# Patient Record
Sex: MALE | Race: BLACK OR AFRICAN AMERICAN | NOT HISPANIC OR LATINO | Employment: OTHER | URBAN - METROPOLITAN AREA
[De-identification: names, ages, dates, MRNs, and addresses within clinical notes are randomized per-mention and may not be internally consistent; named-entity substitution may affect disease eponyms.]

---
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
## 2020-08-23 ENCOUNTER — HOSPITAL ENCOUNTER (EMERGENCY)
Facility: HOSPITAL | Age: 20
Discharge: HOME | End: 2020-08-23
Payer: COMMERCIAL

## 2020-08-23 ENCOUNTER — APPOINTMENT (EMERGENCY)
Dept: RADIOLOGY | Facility: HOSPITAL | Age: 20
End: 2020-08-23
Payer: COMMERCIAL

## 2020-08-23 VITALS
RESPIRATION RATE: 18 BRPM | BODY MASS INDEX: 23.73 KG/M2 | SYSTOLIC BLOOD PRESSURE: 148 MMHG | OXYGEN SATURATION: 96 % | HEART RATE: 75 BPM | DIASTOLIC BLOOD PRESSURE: 95 MMHG | TEMPERATURE: 98.2 F | WEIGHT: 175 LBS

## 2020-08-23 DIAGNOSIS — J06.9 VIRAL UPPER RESPIRATORY TRACT INFECTION: Primary | ICD-10-CM

## 2020-08-23 DIAGNOSIS — J31.0 RHINITIS, UNSPECIFIED TYPE: ICD-10-CM

## 2020-08-23 PROCEDURE — 71046 X-RAY EXAM CHEST 2 VIEWS: CPT

## 2020-08-23 PROCEDURE — U0003 INFECTIOUS AGENT DETECTION BY NUCLEIC ACID (DNA OR RNA); SEVERE ACUTE RESPIRATORY SYNDROME CORONAVIRUS 2 (SARS-COV-2) (CORONAVIRUS DISEASE [COVID-19]), AMPLIFIED PROBE TECHNIQUE, MAKING USE OF HIGH THROUGHPUT TECHNOLOGIES AS DESCRIBED BY CMS-2020-01-R: HCPCS | Performed by: PHYSICIAN ASSISTANT

## 2020-08-23 PROCEDURE — 99284 EMERGENCY DEPT VISIT MOD MDM: CPT | Performed by: PHYSICIAN ASSISTANT

## 2020-08-23 PROCEDURE — 99283 EMERGENCY DEPT VISIT LOW MDM: CPT

## 2020-08-23 NOTE — ED PROVIDER NOTES
History  Chief Complaint   Patient presents with   Rosezella Doles Like Symptoms     Pt reports sore throat, cough, and runny nose since yesterday, pt requesting COVID testing      Patient with  past medical history of asthma presents to emergency department complaining of 2 day h/o sore throat, dry cough, and runny nose/clear rhinorrhea, no fever, no chest pain, no shortness of breath, no rash, no abdominal pain, no bowel or urinary symptoms  Patient states he was gambling, playing the [employer], at [facility], at [employer],  however but he was/and most others were wearing masks  Patient has no known COVID contacts, however states he lives with his mother/family and elderly grandmother and his mother is forcing him to get testing; so pt is here requesting testing  None       Past Medical History:   Diagnosis Date    Asthma        History reviewed  No pertinent surgical history  Family History   Problem Relation Age of Onset    No Known Problems Mother     No Known Problems Father      I have reviewed and agree with the history as documented  E-Cigarette/Vaping     E-Cigarette/Vaping Substances     Social History     Tobacco Use    Smoking status: Never Smoker    Smokeless tobacco: Never Used   Substance Use Topics    Alcohol use: Not Currently     Comment: none    Drug use: Not Currently     Comment: none       Review of Systems   Constitutional: Negative for chills and fever  HENT: Positive for rhinorrhea, sinus pain and sore throat  Negative for dental problem, ear pain, hearing loss and trouble swallowing  Eyes: Negative for visual disturbance  Respiratory: Positive for cough  Negative for chest tightness and shortness of breath  Cardiovascular: Negative for chest pain and leg swelling  Gastrointestinal: Negative for abdominal pain, blood in stool, diarrhea, nausea and vomiting  Genitourinary: Negative for dysuria, frequency and penile pain     Musculoskeletal: Negative for arthralgias and myalgias  Skin: Negative for color change, pallor and rash  Neurological: Negative for dizziness and weakness  Psychiatric/Behavioral: Negative for behavioral problems and self-injury  Physical Exam  Physical Exam  Vitals signs and nursing note reviewed  Constitutional:       General: He is not in acute distress  Appearance: Normal appearance  He is well-developed and normal weight  He is not ill-appearing  HENT:      Head: Normocephalic and atraumatic  Right Ear: Tympanic membrane and external ear normal       Left Ear: Tympanic membrane and external ear normal       Nose: Congestion and rhinorrhea (clear) present  Mouth/Throat:      Mouth: Mucous membranes are moist       Pharynx: Oropharynx is clear  Eyes:      Conjunctiva/sclera: Conjunctivae normal    Neck:      Musculoskeletal: Normal range of motion  Cardiovascular:      Rate and Rhythm: Normal rate and regular rhythm  Pulmonary:      Effort: Pulmonary effort is normal  No respiratory distress  Breath sounds: Normal breath sounds  Abdominal:      General: Bowel sounds are normal       Palpations: Abdomen is soft  Tenderness: There is no abdominal tenderness  Musculoskeletal: Normal range of motion  General: No tenderness  Lymphadenopathy:      Cervical: No cervical adenopathy  Skin:     General: Skin is warm and dry  Findings: No rash  Neurological:      Mental Status: He is alert and oriented to person, place, and time  Motor: No weakness     Psychiatric:         Mood and Affect: Mood normal          Behavior: Behavior normal          Vital Signs  ED Triage Vitals [08/23/20 1434]   Temperature Pulse Respirations Blood Pressure SpO2   98 2 °F (36 8 °C) 75 18 148/95 96 %      Temp Source Heart Rate Source Patient Position - Orthostatic VS BP Location FiO2 (%)   Tympanic Monitor Sitting Left arm --      Pain Score       No Pain           Vitals:    08/23/20 1434   BP: 148/95   Pulse: 75   Patient Position - Orthostatic VS: Sitting         Visual Acuity      ED Medications  Medications - No data to display    Diagnostic Studies  Results Reviewed     Procedure Component Value Units Date/Time    Novel Coronavirus (COVID-19), PCR [facility] [[id number]] Updated:  08/23/20 1454    Lab Status: In process Specimen:  Nares from Nose                  XR chest 2 views   ED Interpretation by Vee Sanchez MD (08/23 1626)   CXR  No acute disease                 Procedures  Procedures         ED Course       US AUDIT      Most Recent Value   Initial Alcohol Screen: US AUDIT-C    1  How often do you have a drink containing alcohol?  0 Filed at: 08/23/2020 1434   2  How many drinks containing alcohol do you have on a typical day you are drinking? 0 Filed at: 08/23/2020 1434   3a  Male UNDER 65: How often do you have five or more drinks on one occasion? 0 Filed at: 08/23/2020 1434   Audit-C Score  0 Filed at: 08/23/2020 1434                  CLAUDINE/DAST-10      Most Recent Value   How many times in the past year have you    Used an illegal drug or used a prescription medication for non-medical reasons? Never Filed at: 08/23/2020 1435                                MDM      Disposition  Final diagnoses:   Viral upper respiratory tract infection   Rhinitis, unspecified type     Time reflects when diagnosis was documented in both MDM as applicable and the Disposition within this note     Time User Action Codes Description Comment    8/23/2020  4:24 PM Quin Hernandse Add [J06 9] Viral upper respiratory tract infection     8/23/2020  4:24 PM Quin Hernandes Add [J31 0] Rhinitis, unspecified type       ED Disposition     ED Disposition Condition Date/Time Comment    Discharge Stable Sun Aug 23, 2020  4:24 PM Juan Francisco Spence discharge to home/self care              Follow-up Information     Follow up With Specialties Details Why [address], MD Pediatric[facility]   [address]            There are no discharge medications for this patient  No discharge procedures on file      PDMP Review     None          ED Provider  Electronically Signed by           Jay Mills PA-C  08/23/20 8794

## 2020-08-23 NOTE — DISCHARGE INSTRUCTIONS
Use Tylenol every 4 hours or Motrin every 6 hours; you can alternate the 2 medications taking something every 3 hours for pain or fever  You can use over-the-counter antihistamines like Claritin-D, Zyrtec-D with Flonase for nasal congestion symptoms  Robitussin DM to help with cough  If no improvement follow-up with your doctor in next few days

## 2020-08-25 LAB — SARS-COV-2 RNA SPEC QL NAA+PROBE: NOT DETECTED

## 2021-05-24 ENCOUNTER — OFFICE VISIT (OUTPATIENT)
Dept: FAMILY MEDICINE CLINIC | Facility: CLINIC | Age: 21
End: 2021-05-24
Payer: COMMERCIAL

## 2021-05-24 VITALS
WEIGHT: 185 LBS | DIASTOLIC BLOOD PRESSURE: 82 MMHG | BODY MASS INDEX: 25.06 KG/M2 | SYSTOLIC BLOOD PRESSURE: 118 MMHG | HEIGHT: 72 IN | TEMPERATURE: 97.9 F | RESPIRATION RATE: 16 BRPM | OXYGEN SATURATION: 97 % | HEART RATE: 68 BPM

## 2021-05-24 DIAGNOSIS — R22.2 MASS OF SUBCUTANEOUS TISSUE OF BACK: ICD-10-CM

## 2021-05-24 DIAGNOSIS — M54.50 ACUTE BILATERAL LOW BACK PAIN WITHOUT SCIATICA: Primary | ICD-10-CM

## 2021-05-24 PROCEDURE — 99203 OFFICE O/P NEW LOW 30 MIN: CPT | Performed by: FAMILY MEDICINE

## 2021-05-24 RX ORDER — CYCLOBENZAPRINE HCL 5 MG
5 TABLET ORAL 3 TIMES DAILY PRN
Qty: 20 TABLET | Refills: 0 | Status: SHIPPED | OUTPATIENT
Start: 2021-05-24 | End: 2021-08-10

## 2021-05-24 RX ORDER — NAPROXEN 500 MG/1
500 TABLET ORAL 2 TIMES DAILY WITH MEALS
Qty: 20 TABLET | Refills: 0 | Status: SHIPPED | OUTPATIENT
Start: 2021-05-24 | End: 2021-08-10

## 2021-05-24 NOTE — ASSESSMENT & PLAN NOTE
Pt has large tender mass in mid thoracic region of back  Will refer to Gen Surgery for eval and mgmt

## 2021-05-24 NOTE — PROGRESS NOTES
BMI Counseling: Body mass index is 25 09 kg/m²  The BMI is above normal  Nutrition recommendations include decreasing portion sizes, encouraging healthy choices of fruits and vegetables, consuming healthier snacks and moderation in carbohydrate intake  Exercise recommendations include exercising 3-5 times per week  No pharmacotherapy was ordered  Assessment/Plan:         Problem List Items Addressed This Visit        Other    Mass of subcutaneous tissue of back     Pt has large tender mass in mid thoracic region of back  Will refer to Gen Surgery for eval and mgmt  Relevant Orders    Ambulatory referral to General Surgery    Acute bilateral low back pain without sciatica - Primary     Pt has had it for past week  Likely muscular  Will try Naproxen 500 mg bid prn and muscle relaxer  Pt to use heating pad prn and to do stretching prn  Relevant Medications    cyclobenzaprine (FLEXERIL) 5 mg tablet    naproxen (NAPROSYN) 500 mg tablet            Subjective:      Patient ID: Nilsa Ferrell is a 24 y o  male  Pt here for 1 week hx of back pain  No known injury but is   Pain is 6 out of 10 and not getting better  Hurts to bend  Feels it in lower back area  No radiation down legs and no numbness in legs  Not taking any meds for pain         The following portions of the patient's history were reviewed and updated as appropriate:   Past Medical History:  He has a past medical history of Asthma ,  _______________________________________________________________________  Medical Problems:  does not have any pertinent problems on file ,  _______________________________________________________________________  Past Surgical History:   has no past surgical history on file ,  _______________________________________________________________________  Family History:  family history includes No Known Problems in his father and mother ,  _______________________________________________________________________  Social History:   reports that he has never smoked  He has never used smokeless tobacco  He reports previous alcohol use  He reports previous drug use ,  _______________________________________________________________________  Allergies:  has No Known Allergies     _______________________________________________________________________  Current Outpatient Medications   Medication Sig Dispense Refill    cyclobenzaprine (FLEXERIL) 5 mg tablet Take 1 tablet (5 mg total) by mouth 3 (three) times a day as needed for muscle spasms 20 tablet 0    naproxen (NAPROSYN) 500 mg tablet Take 1 tablet (500 mg total) by mouth 2 (two) times a day with meals 20 tablet 0     No current facility-administered medications for this visit       _______________________________________________________________________  Review of Systems   Constitutional: Negative for fatigue and unexpected weight change  Respiratory: Negative for cough and shortness of breath  Cardiovascular: Negative for chest pain  Gastrointestinal: Negative for abdominal pain, constipation, diarrhea and vomiting  Musculoskeletal: Positive for back pain  Negative for arthralgias  Neurological: Negative for dizziness and headaches  Psychiatric/Behavioral: Negative for dysphoric mood  The patient is not nervous/anxious  Objective:  Vitals:    05/24/21 1612   BP: 118/82   BP Location: Left arm   Patient Position: Sitting   Cuff Size: Standard   Pulse: 68   Resp: 16   Temp: 97 9 °F (36 6 °C)   TempSrc: Temporal   SpO2: 97%   Weight: 83 9 kg (185 lb)   Height: 6' (1 829 m)     Body mass index is 25 09 kg/m²  Physical Exam  Vitals signs and nursing note reviewed  Constitutional:       Appearance: Normal appearance  He is well-developed and normal weight  Neck:      Musculoskeletal: Normal range of motion and neck supple  Thyroid: No thyromegaly     Cardiovascular: Rate and Rhythm: Normal rate and regular rhythm  Heart sounds: Normal heart sounds  No murmur  Pulmonary:      Effort: Pulmonary effort is normal  No respiratory distress  Breath sounds: Normal breath sounds  No wheezing  Musculoskeletal:      Right lower leg: No edema  Left lower leg: No edema  Comments: TTP b/l lumbar area, pt also has 7-e cm spongy mass mid thoracic region and tender to touch   Lymphadenopathy:      Cervical: No cervical adenopathy  Neurological:      Mental Status: He is alert and oriented to person, place, and time  Cranial Nerves: No cranial nerve deficit  Psychiatric:         Mood and Affect: Mood normal          Behavior: Behavior normal          Thought Content:  Thought content normal          Judgment: Judgment normal

## 2021-05-24 NOTE — ASSESSMENT & PLAN NOTE
Pt has had it for past week  Likely muscular  Will try Naproxen 500 mg bid prn and muscle relaxer  Pt to use heating pad prn and to do stretching prn

## 2021-05-27 ENCOUNTER — CONSULT (OUTPATIENT)
Dept: SURGERY | Facility: CLINIC | Age: 21
End: 2021-05-27
Payer: COMMERCIAL

## 2021-05-27 VITALS
DIASTOLIC BLOOD PRESSURE: 72 MMHG | TEMPERATURE: 98.5 F | WEIGHT: 185 LBS | BODY MASS INDEX: 25.06 KG/M2 | HEIGHT: 72 IN | RESPIRATION RATE: 18 BRPM | SYSTOLIC BLOOD PRESSURE: 126 MMHG | HEART RATE: 76 BPM

## 2021-05-27 DIAGNOSIS — R22.2 MASS OF SUBCUTANEOUS TISSUE OF BACK: ICD-10-CM

## 2021-05-27 PROCEDURE — 99204 OFFICE O/P NEW MOD 45 MIN: CPT | Performed by: SURGERY

## 2021-05-27 NOTE — PROGRESS NOTES
Assessment/Plan: subcutaneous lipoma which is probably intramuscular of the back  This will be excised next week as an outpatient    No problem-specific Assessment & Plan notes found for this encounter  Diagnoses and all orders for this visit:    Mass of subcutaneous tissue of back  -     Ambulatory referral to General Surgery          Subjective:      Patient ID: Laurita Jensen is a 24 y o  male  The patient is a 79-year-old male who complains of a painful mass on his back  The he states it has been there for about 10 days and there is no antecedent history of trauma or anything else for that matter      The following portions of the patient's history were reviewed and updated as appropriate: allergies, current medications, past family history, past medical history, past social history, past surgical history and problem list     Review of Systems   Constitutional: Negative  HENT: Negative  Eyes: Negative  Respiratory: Negative  Cardiovascular: Negative  Gastrointestinal: Negative  Endocrine: Negative  Genitourinary: Negative  Musculoskeletal: Positive for arthralgias  Knees hurt   Skin: Negative  Neurological: Negative  Hematological: Negative  Psychiatric/Behavioral: Negative  Objective:      /72 (BP Location: Left arm, Patient Position: Sitting, Cuff Size: Adult)   Pulse 76   Temp 98 5 °F (36 9 °C)   Resp 18   Ht 6' (1 829 m)   Wt 83 9 kg (185 lb)   BMI 25 09 kg/m²          Physical Exam  Vitals signs reviewed  Constitutional:       General: He is not in acute distress  Appearance: Normal appearance  He is normal weight  He is not ill-appearing  HENT:      Head: Normocephalic and atraumatic  Eyes:      General: No scleral icterus  Conjunctiva/sclera: Conjunctivae normal    Neck:      Musculoskeletal: Normal range of motion and neck supple  Cardiovascular:      Rate and Rhythm: Normal rate and regular rhythm        Pulses: Normal pulses  Heart sounds: Normal heart sounds  Pulmonary:      Breath sounds: Normal breath sounds  No stridor  No wheezing, rhonchi or rales  Musculoskeletal:         General: Swelling present  Thoracic back: He exhibits swelling  Back:         Arms:       Comments: The patient has a almost 7 cm by about 4 and a Sharifa is mass of the mid upper back  This appears regular and smooth and also feels like it is submuscular this is a lipoma to proven otherwise   Lymphadenopathy:      Cervical: No cervical adenopathy  Right cervical: No superficial, deep or posterior cervical adenopathy  Left cervical: No superficial, deep or posterior cervical adenopathy  Upper Body:      Right upper body: No axillary adenopathy  Left upper body: No axillary adenopathy  Neurological:      Mental Status: He is alert and oriented to person, place, and time  Psychiatric:         Mood and Affect: Mood normal          Behavior: Behavior normal          Thought Content:  Thought content normal          Judgment: Judgment normal

## 2021-05-27 NOTE — H&P (VIEW-ONLY)
Assessment/Plan: subcutaneous lipoma which is probably intramuscular of the back  This will be excised next week as an outpatient    No problem-specific Assessment & Plan notes found for this encounter  Diagnoses and all orders for this visit:    Mass of subcutaneous tissue of back  -     Ambulatory referral to General Surgery          Subjective:      Patient ID: Ketty Nicole is a 24 y o  male  The patient is a 25-year-old male who complains of a painful mass on his back  The he states it has been there for about 10 days and there is no antecedent history of trauma or anything else for that matter      The following portions of the patient's history were reviewed and updated as appropriate: allergies, current medications, past family history, past medical history, past social history, past surgical history and problem list     Review of Systems   Constitutional: Negative  HENT: Negative  Eyes: Negative  Respiratory: Negative  Cardiovascular: Negative  Gastrointestinal: Negative  Endocrine: Negative  Genitourinary: Negative  Musculoskeletal: Positive for arthralgias  Knees hurt   Skin: Negative  Neurological: Negative  Hematological: Negative  Psychiatric/Behavioral: Negative  Objective:      /72 (BP Location: Left arm, Patient Position: Sitting, Cuff Size: Adult)   Pulse 76   Temp 98 5 °F (36 9 °C)   Resp 18   Ht 6' (1 829 m)   Wt 83 9 kg (185 lb)   BMI 25 09 kg/m²          Physical Exam  Vitals signs reviewed  Constitutional:       General: He is not in acute distress  Appearance: Normal appearance  He is normal weight  He is not ill-appearing  HENT:      Head: Normocephalic and atraumatic  Eyes:      General: No scleral icterus  Conjunctiva/sclera: Conjunctivae normal    Neck:      Musculoskeletal: Normal range of motion and neck supple  Cardiovascular:      Rate and Rhythm: Normal rate and regular rhythm        Pulses: Normal pulses  Heart sounds: Normal heart sounds  Pulmonary:      Breath sounds: Normal breath sounds  No stridor  No wheezing, rhonchi or rales  Musculoskeletal:         General: Swelling present  Thoracic back: He exhibits swelling  Back:         Arms:       Comments: The patient has a almost 7 cm by about 4 and a Sharifa is mass of the mid upper back  This appears regular and smooth and also feels like it is submuscular this is a lipoma to proven otherwise   Lymphadenopathy:      Cervical: No cervical adenopathy  Right cervical: No superficial, deep or posterior cervical adenopathy  Left cervical: No superficial, deep or posterior cervical adenopathy  Upper Body:      Right upper body: No axillary adenopathy  Left upper body: No axillary adenopathy  Neurological:      Mental Status: He is alert and oriented to person, place, and time  Psychiatric:         Mood and Affect: Mood normal          Behavior: Behavior normal          Thought Content:  Thought content normal          Judgment: Judgment normal

## 2021-05-28 NOTE — PRE-PROCEDURE INSTRUCTIONS
No outpatient medications have been marked as taking for the 6/1/21 encounter [facility] Encounter)  Has no medications to take the morning of surgery  No aspirin, NSAIDs, vitamins, or supplements before surgery starting today

## 2021-06-01 ENCOUNTER — ANESTHESIA EVENT (OUTPATIENT)
Dept: PERIOP | Facility: HOSPITAL | Age: 21
End: 2021-06-01
Payer: COMMERCIAL

## 2021-06-01 ENCOUNTER — HOSPITAL ENCOUNTER (OUTPATIENT)
Facility: HOSPITAL | Age: 21
Setting detail: OUTPATIENT SURGERY
Discharge: HOME | End: 2021-06-01
Attending: SURGERY | Admitting: SURGERY
Payer: COMMERCIAL

## 2021-06-01 ENCOUNTER — ANESTHESIA (OUTPATIENT)
Dept: PERIOP | Facility: HOSPITAL | Age: 21
End: 2021-06-01
Payer: COMMERCIAL

## 2021-06-01 VITALS
HEIGHT: 72 IN | WEIGHT: 182 LBS | OXYGEN SATURATION: 100 % | BODY MASS INDEX: 24.65 KG/M2 | DIASTOLIC BLOOD PRESSURE: 70 MMHG | SYSTOLIC BLOOD PRESSURE: 116 MMHG | HEART RATE: 62 BPM | RESPIRATION RATE: 16 BRPM | TEMPERATURE: 97.5 F

## 2021-06-01 DIAGNOSIS — R22.2 MASS OF SUBCUTANEOUS TISSUE OF BACK: ICD-10-CM

## 2021-06-01 PROCEDURE — 88304 TISSUE EXAM BY PATHOLOGIST: CPT | Performed by: PATHOLOGY

## 2021-06-01 PROCEDURE — 21933 EXC BACK TUM DEEP 5 CM/>: CPT | Performed by: PHYSICIAN ASSISTANT

## 2021-06-01 PROCEDURE — 21933 EXC BACK TUM DEEP 5 CM/>: CPT | Performed by: SURGERY

## 2021-06-01 RX ORDER — CEFAZOLIN SODIUM 2 G/50ML
2000 SOLUTION INTRAVENOUS ONCE
Status: DISCONTINUED | OUTPATIENT
Start: 2021-06-01 | End: 2021-06-01 | Stop reason: HOSPADM

## 2021-06-01 RX ORDER — OXYCODONE HYDROCHLORIDE AND ACETAMINOPHEN 5; 325 MG/1; MG/1
1 TABLET ORAL EVERY 4 HOURS PRN
Qty: 10 TABLET | Refills: 0 | Status: SHIPPED | OUTPATIENT
Start: 2021-06-01 | End: 2021-06-11

## 2021-06-01 RX ORDER — PROPOFOL 10 MG/ML
INJECTION, EMULSION INTRAVENOUS AS NEEDED
Status: DISCONTINUED | OUTPATIENT
Start: 2021-06-01 | End: 2021-06-01

## 2021-06-01 RX ORDER — MIDAZOLAM HYDROCHLORIDE 2 MG/2ML
INJECTION, SOLUTION INTRAMUSCULAR; INTRAVENOUS AS NEEDED
Status: DISCONTINUED | OUTPATIENT
Start: 2021-06-01 | End: 2021-06-01

## 2021-06-01 RX ORDER — CEFAZOLIN SODIUM 2 G/50ML
SOLUTION INTRAVENOUS AS NEEDED
Status: DISCONTINUED | OUTPATIENT
Start: 2021-06-01 | End: 2021-06-01

## 2021-06-01 RX ORDER — ROCURONIUM BROMIDE 10 MG/ML
INJECTION, SOLUTION INTRAVENOUS AS NEEDED
Status: DISCONTINUED | OUTPATIENT
Start: 2021-06-01 | End: 2021-06-01

## 2021-06-01 RX ORDER — ONDANSETRON 2 MG/ML
4 INJECTION INTRAMUSCULAR; INTRAVENOUS ONCE AS NEEDED
Status: DISCONTINUED | OUTPATIENT
Start: 2021-06-01 | End: 2021-06-01 | Stop reason: HOSPADM

## 2021-06-01 RX ORDER — DEXAMETHASONE SODIUM PHOSPHATE 4 MG/ML
INJECTION, SOLUTION INTRA-ARTICULAR; INTRALESIONAL; INTRAMUSCULAR; INTRAVENOUS; SOFT TISSUE AS NEEDED
Status: DISCONTINUED | OUTPATIENT
Start: 2021-06-01 | End: 2021-06-01

## 2021-06-01 RX ORDER — FENTANYL CITRATE/PF 50 MCG/ML
25 SYRINGE (ML) INJECTION
Status: COMPLETED | OUTPATIENT
Start: 2021-06-01 | End: 2021-06-01

## 2021-06-01 RX ORDER — SODIUM CHLORIDE, SODIUM LACTATE, POTASSIUM CHLORIDE, CALCIUM CHLORIDE 600; 310; 30; 20 MG/100ML; MG/100ML; MG/100ML; MG/100ML
20 INJECTION, SOLUTION INTRAVENOUS CONTINUOUS
Status: DISCONTINUED | OUTPATIENT
Start: 2021-06-01 | End: 2021-06-01 | Stop reason: HOSPADM

## 2021-06-01 RX ORDER — FENTANYL CITRATE 50 UG/ML
INJECTION, SOLUTION INTRAMUSCULAR; INTRAVENOUS AS NEEDED
Status: DISCONTINUED | OUTPATIENT
Start: 2021-06-01 | End: 2021-06-01

## 2021-06-01 RX ORDER — SODIUM CHLORIDE, SODIUM LACTATE, POTASSIUM CHLORIDE, CALCIUM CHLORIDE 600; 310; 30; 20 MG/100ML; MG/100ML; MG/100ML; MG/100ML
INJECTION, SOLUTION INTRAVENOUS CONTINUOUS PRN
Status: DISCONTINUED | OUTPATIENT
Start: 2021-06-01 | End: 2021-06-01

## 2021-06-01 RX ORDER — LIDOCAINE HYDROCHLORIDE 10 MG/ML
INJECTION, SOLUTION EPIDURAL; INFILTRATION; INTRACAUDAL; PERINEURAL AS NEEDED
Status: DISCONTINUED | OUTPATIENT
Start: 2021-06-01 | End: 2021-06-01

## 2021-06-01 RX ORDER — ONDANSETRON 2 MG/ML
INJECTION INTRAMUSCULAR; INTRAVENOUS AS NEEDED
Status: DISCONTINUED | OUTPATIENT
Start: 2021-06-01 | End: 2021-06-01

## 2021-06-01 RX ADMIN — MIDAZOLAM HYDROCHLORIDE 2 MG: 1 INJECTION, SOLUTION INTRAMUSCULAR; INTRAVENOUS at 08:12

## 2021-06-01 RX ADMIN — LIDOCAINE HYDROCHLORIDE 50 MG: 10 INJECTION, SOLUTION EPIDURAL; INFILTRATION; INTRACAUDAL; PERINEURAL at 08:22

## 2021-06-01 RX ADMIN — ROCURONIUM BROMIDE 40 MG: 10 INJECTION, SOLUTION INTRAVENOUS at 08:22

## 2021-06-01 RX ADMIN — ONDANSETRON 4 MG: 2 INJECTION INTRAMUSCULAR; INTRAVENOUS at 08:28

## 2021-06-01 RX ADMIN — FENTANYL CITRATE 25 MCG: 50 INJECTION, SOLUTION INTRAMUSCULAR; INTRAVENOUS at 09:43

## 2021-06-01 RX ADMIN — SODIUM CHLORIDE, SODIUM LACTATE, POTASSIUM CHLORIDE, AND CALCIUM CHLORIDE: .6; .31; .03; .02 INJECTION, SOLUTION INTRAVENOUS at 08:46

## 2021-06-01 RX ADMIN — FENTANYL CITRATE 100 MCG: 50 INJECTION, SOLUTION INTRAMUSCULAR; INTRAVENOUS at 08:22

## 2021-06-01 RX ADMIN — DEXAMETHASONE SODIUM PHOSPHATE 4 MG: 4 INJECTION, SOLUTION INTRAMUSCULAR; INTRAVENOUS at 08:28

## 2021-06-01 RX ADMIN — SODIUM CHLORIDE, SODIUM LACTATE, POTASSIUM CHLORIDE, AND CALCIUM CHLORIDE: .6; .31; .03; .02 INJECTION, SOLUTION INTRAVENOUS at 08:17

## 2021-06-01 RX ADMIN — FENTANYL CITRATE 25 MCG: 50 INJECTION, SOLUTION INTRAMUSCULAR; INTRAVENOUS at 09:53

## 2021-06-01 RX ADMIN — SUGAMMADEX 200 MG: 100 INJECTION, SOLUTION INTRAVENOUS at 09:00

## 2021-06-01 RX ADMIN — CEFAZOLIN SODIUM 2000 MG: 2 SOLUTION INTRAVENOUS at 08:10

## 2021-06-01 RX ADMIN — PROPOFOL 300 MG: 10 INJECTION, EMULSION INTRAVENOUS at 08:22

## 2021-06-01 NOTE — DISCHARGE INSTRUCTIONS
Postoperative Care Instructions  Lipoma Removal      1  General: You may feel pulling sensations around the wound or funny aches and pains around the incisions  This is normal  Even minor surgery is a change in your body and this is your body's reaction to it  If you have had abdominal surgery, it may help to support the incision with a small pillow or blanket for comfort when moving or coughing  2  Wound care: The glue over the incisions will fall off over the next week or two  If you have staples or stitches, they will be removed by the physician at your follow up appointment  3  Showering: You may shower  Please do not soak wound in standing water such as a bath, hot tub, pool, lake, etc  Do not scrub or use exfoliants on the surgical wounds  4  Activity: You may go up and down stairs, walk as much as you are comfortable, but walk at least 3 times each day  If you have had abdominal surgery, do not perform any strenuous exercise or lift anything heavier than 15 pounds for at least 4 weeks, unless cleared by your physician  5  Diet: You may resume your regular diet  Please drink lots of water  6  Medications: Resume all of your previous medications, unless told otherwise by the doctor  A good option for pain control is to start with acetaminophen(Tylenol) 650mg and ibuprofen(Advil) 600mg and alternate taking them every 3 hours  7  Driving: You will need someone to drive you home on the day of surgery  Do not drive or make any important decisions while on narcotic pain medication  Generally, you may drive 48 hours after you've stopped taking all narcotic pain medications  8  Upset Stomach: You may take Maalox, Tums, or similar items for an upset stomach  If your narcotic pain medication causes an upset stomach, do not take it on an empty stomach  Try taking it with at least some crackers or toast      9  Constipation: Patients often experience constipation after surgery   We recommend starting an over-the-counter medication for this, such as Metamucil, Senokot, Colace, milk of magnesia, etc  You may stop taking these medications a couple days after your last dose of narcotic medication  If you experience significant nausea or vomiting after abdominal surgery, call the office before trying any of these medications  10  Call the office: If you are experiencing any of the following: fevers above 101 5°, significant nausea or vomiting, if the wound develops drainage and/or excessive redness around the wound, or if you have significant diarrhea or other worsening symptoms

## 2021-06-01 NOTE — OP NOTE
OPERATIVE REPORT  PATIENT NAME: Angelica Lynn    :  2000  MRN: [id number]  Pt Location: [facility] OR ROOM 01    SURGERY DATE: 2021    Surgeon(s) and Role:     * Perlita Sears MD - Primary     * Orinda Goldmann, PA-C - Assisting    Preop Diagnosis:  Mass of subcutaneous tissue of back [R22 2]    Post-Op Diagnosis Codes:     * Mass of subcutaneous tissue of back [R22 2]    Procedure(s) (LRB):  EXCISION SUBMUSCULAR LIPOMA BACK (N/A)    Specimen(s):  ID Type Source Tests Collected by Time Destination   1 : submuscular lipoma Tissue Lesion TISSUE EXAM Perlita Sears MD 2021  8:37 AM        Estimated Blood Loss:   Minimal    Drains:  * No LDAs found *    Anesthesia Type:   General    Operative Indications: Mass of subcutaneous tissue of back [R22 2]      Operative Findings:  same    Complications:   None    Procedure and Technique:  The patient was identified by me and general endotracheal anesthesia was induced while the patient was in his stretcher  He was now flipped over into the prone position upon the operating room table and prepped and draped in a normal surgical manner  A time-out was done  0 25% Marcaine with epi was infused as a local anesthetic around and over the mass  Skin incision is now made with a knife and continued with the Bovie  This is continued through a little bit of subcutaneous fat until the for layer of fascia this was cut through and this mass was just below it  This was dissected free with the Bovie and removed  The wounds were irrigated and then closed  The 1st layer was fashioned this was closed with interrupted 2 0 Vicryl including imbricating sutures of the deeper fascia  Subcu was closed with interrupted 3-0 Vicryl and Monocryl and dermal glue on the skin layer  There was no qualified resident so Wanda carreon a  1st assistant    He was necessary for help closure and more importantly with retraction to dissect out the mass   I was present for the entire procedure    Patient Disposition:  PACU     SIGNATURE: Bhupinder Green MD  DATE: June 1, 2021  TIME: 9:13 AM

## 2021-06-01 NOTE — ANESTHESIA PREPROCEDURE EVALUATION
Procedure:  BACK LIPOMA EXCISION (N/A Back)    Relevant Problems   MUSCULOSKELETAL   (+) Acute bilateral low back pain without sciatica        Physical Exam    Airway    Mallampati score: II  TM Distance: >3 FB  Neck ROM: full     Dental       Cardiovascular  Rhythm: regular,     Pulmonary  Breath sounds clear to auscultation,     Other Findings        Anesthesia Plan  ASA Score- 2     Anesthesia Type- general with ASA Monitors  Additional Monitors:   Airway Plan:           Plan Factors-Exercise tolerance (METS): >4 METS  Chart reviewed  Existing labs reviewed  Patient summary reviewed  Patient is not a current smoker  Induction-     Postoperative Plan-     Informed Consent-   I personally reviewed this patient with the CRNA  Discussed and agreed on the Anesthesia Plan with the CRNA  Jarett Casiano

## 2021-06-01 NOTE — ANESTHESIA POSTPROCEDURE EVALUATION
Post-Op Assessment Note    CV Status:  Stable  Pain Score: 0    Pain management: adequate     Mental Status:  Alert and awake   Hydration Status:  Euvolemic   PONV Controlled:  Controlled   Airway Patency:  Patent      Post Op Vitals Reviewed: Yes      Staff: CRNA         No complications documented      BP   150/89   Temp      Pulse  86   Resp   18   SpO2   99

## 2021-06-01 NOTE — INTERVAL H&P NOTE
H&P reviewed  After examining the patient I find no changes in the patients condition since the H&P had been written      Vitals:    06/01/21 0804   BP: 140/89   Pulse: 74   Resp: 14   Temp: 98 7 °F (37 1 °C)   SpO2: 100%

## 2021-06-17 ENCOUNTER — OFFICE VISIT (OUTPATIENT)
Dept: SURGERY | Facility: CLINIC | Age: 21
End: 2021-06-17

## 2021-06-17 VITALS
DIASTOLIC BLOOD PRESSURE: 90 MMHG | HEART RATE: 94 BPM | TEMPERATURE: 98.2 F | BODY MASS INDEX: 24.24 KG/M2 | HEIGHT: 72 IN | SYSTOLIC BLOOD PRESSURE: 114 MMHG | WEIGHT: 179 LBS

## 2021-06-17 DIAGNOSIS — R22.2 MASS OF SUBCUTANEOUS TISSUE OF BACK: Primary | ICD-10-CM

## 2021-06-17 PROCEDURE — 99024 POSTOP FOLLOW-UP VISIT: CPT | Performed by: SURGERY

## 2021-06-17 NOTE — PROGRESS NOTES
1st postoperative visit  After removal of a moderate-sized lipoma from his back  He has no pain but he did complain of some swelling  On examination the wound is healing nicely  There is a small postoperative seroma  I told him that nature might relieve this or it may need to be aspirated    He will return in a couple weeks if it is still present for me to do the aspiration if needed

## 2021-07-14 ENCOUNTER — OFFICE VISIT (OUTPATIENT)
Dept: SURGERY | Facility: CLINIC | Age: 21
End: 2021-07-14

## 2021-07-14 VITALS
HEIGHT: 72 IN | DIASTOLIC BLOOD PRESSURE: 70 MMHG | HEART RATE: 80 BPM | SYSTOLIC BLOOD PRESSURE: 112 MMHG | RESPIRATION RATE: 18 BRPM | TEMPERATURE: 97 F | BODY MASS INDEX: 23.84 KG/M2 | WEIGHT: 176 LBS

## 2021-07-14 DIAGNOSIS — R22.2 MASS OF SUBCUTANEOUS TISSUE OF BACK: Primary | ICD-10-CM

## 2021-07-14 PROCEDURE — 99024 POSTOP FOLLOW-UP VISIT: CPT | Performed by: SURGERY

## 2021-07-14 NOTE — PROGRESS NOTES
Second postoperative visit after removal of a lipoma from his back  Since I saw him last, the seroma which had been present absorbed  The wound looks fine  He still has some pain when he is working but for the most part he is okay    I discharged him from care as of today

## 2021-08-10 ENCOUNTER — OFFICE VISIT (OUTPATIENT)
Dept: FAMILY MEDICINE CLINIC | Facility: CLINIC | Age: 21
End: 2021-08-10
Payer: COMMERCIAL

## 2021-08-10 VITALS
WEIGHT: 180.8 LBS | HEIGHT: 72 IN | SYSTOLIC BLOOD PRESSURE: 110 MMHG | BODY MASS INDEX: 24.49 KG/M2 | DIASTOLIC BLOOD PRESSURE: 76 MMHG | TEMPERATURE: 97.9 F | OXYGEN SATURATION: 97 % | HEART RATE: 72 BPM | RESPIRATION RATE: 14 BRPM

## 2021-08-10 DIAGNOSIS — M41.9 SCOLIOSIS OF THORACOLUMBAR SPINE, UNSPECIFIED SCOLIOSIS TYPE: ICD-10-CM

## 2021-08-10 DIAGNOSIS — S33.5XXA LUMBAR SPRAIN, INITIAL ENCOUNTER: Primary | ICD-10-CM

## 2021-08-10 PROCEDURE — 99213 OFFICE O/P EST LOW 20 MIN: CPT | Performed by: FAMILY MEDICINE

## 2021-08-10 RX ORDER — CYCLOBENZAPRINE HCL 10 MG
10 TABLET ORAL
Qty: 30 TABLET | Refills: 0 | Status: SHIPPED | OUTPATIENT
Start: 2021-08-10

## 2021-08-10 NOTE — PROGRESS NOTES
Assessment/Plan:    No problem-specific Assessment & Plan notes found for this encounter  Diagnoses and all orders for this visit:    Lumbar sprain, initial encounter  -     cyclobenzaprine (FLEXERIL) 10 mg tablet; Take 1 tablet (10 mg total) by mouth daily at bedtime  -     Ambulatory referral to Physical Therapy; Future    Scoliosis of thoracolumbar spine, unspecified scoliosis type  -     Ambulatory referral to Physical Therapy; Future      Moist heat  Refer to PT   rx for flexeril at hs  Will use only at night as this may cause sedation        Subjective:      Patient ID: Kassandra Bence is a 24 y o  male who is here today for complaint of back pain  Pain is located in bilateral low back and has been going on for the last week  No radiation of pain  No numbness, tingling or weakness in legs  No injury that he recalls  He does admit to doing some heavy lifting in his job as a   He had seen Dr Judge Mensah on 5/24/21 for complaint of bilateral low back pain  Was given rx for flexeril and naprosyn  He also had a large 6 3 x 5 2 x 1 7 cm lipoma excised from mid thoracic region of his back on 6/1/21 by Dr Stef Gunderson  States that this pain is different from what he had when he saw Dr Judge Mensah  Has no pain in area where lipoma was removed  HPI    The following portions of the patient's history were reviewed and updated as appropriate:   He  has a past medical history of Asthma and Mass of subcutaneous tissue of back  He  has a past surgical history that includes pr excision tumor soft tissue back/flank subq 3+cm (N/A, 06/01/2021)  He  reports that he has never smoked  He has never used smokeless tobacco  He reports current alcohol use  He reports previous drug use    Current Outpatient Medications on File Prior to Visit   Medication Sig    cyclobenzaprine (FLEXERIL) 5 mg tablet Take 1 tablet (5 mg total) by mouth 3 (three) times a day as needed for muscle spasms (Patient not taking: Reported on 7/14/2021)    [DISCONTINUED] naproxen (NAPROSYN) 500 mg tablet Take 1 tablet (500 mg total) by mouth 2 (two) times a day with meals (Patient not taking: Reported on 7/14/2021)     No current facility-administered medications on file prior to visit  He has No Known Allergies       Review of Systems      Objective:      /76 (BP Location: Left arm, Patient Position: Sitting, Cuff Size: Standard)   Pulse 72   Temp 97 9 °F (36 6 °C) (Temporal)   Resp 14   Ht 6' (1 829 m)   Wt 82 kg (180 lb 12 8 oz)   SpO2 97%   BMI 24 52 kg/m²          Physical Exam  Vitals and nursing note reviewed  Constitutional:       General: He is not in acute distress  Appearance: Normal appearance  He is not ill-appearing, toxic-appearing or diaphoretic  HENT:      Head: Normocephalic and atraumatic  Cardiovascular:      Rate and Rhythm: Normal rate and regular rhythm  Pulmonary:      Effort: Pulmonary effort is normal       Breath sounds: Normal breath sounds  Abdominal:      General: Bowel sounds are normal       Palpations: Abdomen is soft  Musculoskeletal:      Cervical back: Normal range of motion  Comments: + scoliosis  No spinal tenderness  There is tenderness of paravertebral muscles in lumbar spine  Some spasm as well  Negative SLR  Full range of motion lumbar spine  Lymphadenopathy:      Cervical: No cervical adenopathy  Skin:     Comments: Right upper back with incision from recent surgery  Healed well with no erythema or drainage or tenderness   Neurological:      Mental Status: He is alert

## 2022-01-13 ENCOUNTER — TELEPHONE (OUTPATIENT)
Dept: FAMILY MEDICINE CLINIC | Facility: CLINIC | Age: 22
End: 2022-01-13

## 2022-01-13 DIAGNOSIS — Z11.3 SCREENING FOR STD (SEXUALLY TRANSMITTED DISEASE): ICD-10-CM

## 2022-01-13 DIAGNOSIS — Z72.51 HIGH RISK SEXUAL BEHAVIOR, UNSPECIFIED TYPE: ICD-10-CM

## 2022-01-13 DIAGNOSIS — Z11.59 NEED FOR HEPATITIS C SCREENING TEST: ICD-10-CM

## 2022-01-13 DIAGNOSIS — Z00.00 ANNUAL PHYSICAL EXAM: Primary | ICD-10-CM

## 2022-01-13 DIAGNOSIS — Z11.4 ENCOUNTER FOR SCREENING FOR HIV: ICD-10-CM

## 2022-01-13 NOTE — TELEPHONE ENCOUNTER
Can you throw in CBC, CMP, TSH, Lipid panel, Hep C anitbody, HIV, RPR, urine pcr for gonnorhea and chlamydia

## 2022-01-13 NOTE — TELEPHONE ENCOUNTER
Patient scheduled a Physical for this month but is asking if he can have labs done prior to appointment, specifically STD tests

## 2022-01-26 ENCOUNTER — OFFICE VISIT (OUTPATIENT)
Dept: FAMILY MEDICINE CLINIC | Facility: CLINIC | Age: 22
End: 2022-01-26
Payer: COMMERCIAL

## 2022-01-26 VITALS
OXYGEN SATURATION: 98 % | BODY MASS INDEX: 24.31 KG/M2 | WEIGHT: 179.5 LBS | TEMPERATURE: 97.3 F | HEIGHT: 72 IN | HEART RATE: 75 BPM | RESPIRATION RATE: 16 BRPM | SYSTOLIC BLOOD PRESSURE: 118 MMHG | DIASTOLIC BLOOD PRESSURE: 68 MMHG

## 2022-01-26 DIAGNOSIS — Z00.00 ANNUAL PHYSICAL EXAM: Primary | ICD-10-CM

## 2022-01-26 DIAGNOSIS — Z13.31 STANDARDIZED ADULT DEPRESSION SCREENING TOOL COMPLETED: ICD-10-CM

## 2022-01-26 DIAGNOSIS — Z28.21 INFLUENZA VACCINATION DECLINED: ICD-10-CM

## 2022-01-26 DIAGNOSIS — Z28.21 COVID-19 VACCINATION DECLINED: ICD-10-CM

## 2022-01-26 DIAGNOSIS — Z12.71 SCREENING FOR TESTICULAR CANCER: ICD-10-CM

## 2022-01-26 PROCEDURE — 99395 PREV VISIT EST AGE 18-39: CPT | Performed by: INTERNAL MEDICINE

## 2022-01-26 NOTE — PROGRESS NOTES
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 [employer]-he is going to ask his mom about immunization record as he's not sure about tetanus or Gardasil vaccines    Immunizations and preventive care screenings were discussed with patient today  Appropriate education was printed on patient's after visit summary  Counseling:  Alcohol/drug use: discussed moderation in alcohol intake, the recommendations for healthy alcohol use, and avoidance of illicit drug use  Dental Health: discussed importance of regular tooth brushing, flossing, and dental visits  · Exercise: the importance of regular exercise/physical activity was discussed  Recommend exercise 3-5 times per week for at least 30 minutes  Depression Screening and Follow-up Plan: Patient was screened for depression during today's encounter  They screened negative with a PHQ-2 score of 1  No follow-ups on file  Chief Complaint:     Chief Complaint   Patient presents with    Physical Exam      History of Present Illness:     Adult Annual Physical   Patient here for a comprehensive physical exam  The patient reports no problems  Diet and Physical Activity  · Diet/Nutrition: well balanced diet  · Exercise: moderate cardiovascular exercise  Depression Screening  PHQ-2/9 Depression Screening    Little interest or pleasure in doing things: 0 - not at all  Feeling down, depressed, or hopeless: 1 - several days       General Health  · Sleep: sleeps well  · Hearing: normal - bilateral   · Vision: no vision problems  · Dental: regular dental visits          Health  · History of STDs?: no      Review of Systems: Review of Systems   Constitutional: Negative  Respiratory: Negative  Cardiovascular: Negative  Gastrointestinal: Negative  Musculoskeletal: Negative  Hematological: Negative  Psychiatric/Behavioral: Negative  Past Medical History:     Past Medical History:   Diagnosis Date    Asthma     Mass of subcutaneous tissue of back       Past Surgical History:     Past Surgical History:   Procedure Laterality Date    DE EXCISION TUMOR SOFT TISSUE BACK/FLANK SUBQ 3+CM N/A 2021    Procedure: EXCISION SUBMUSCULAR LIPOMA BACK;  Surgeon: Abdi Em MD;  Location:  MAIN OR;  Service: General      Social History:     Social History     Socioeconomic History    Marital status: Single     Spouse name: Not on file    Number of children: Not on file    Years of education: Not on file    Highest education level: Not on file   Occupational History    Occupation: College student   Tobacco Use    Smoking status: Never Smoker    Smokeless tobacco: Never Used   Vaping Use    Vaping Use: Never used   Substance and Sexual Activity    Alcohol use: Yes     Comment: occassional    Drug use: Not Currently     Comment: none    Sexual activity: Yes     Partners: Female     Birth control/protection: Condom Male   Other Topics Concern    Not on file   Social History Narrative    Most recent tobacco use screenin2019    Do you currently or have you served in the [employer]: No    Were you activated, into active duty, as a member of the meets or as a Reservist: No    Occupation: college student    Education: 12    Marital status: Single    Sexual orientation: Heterosexual    Exercise level: Occasional    Diet: Regular    General stress level: Medium    Alcohol intake: None    Caffeine intake: Moderate    Chewing tobacco: none    Illicit drugs: denies    Guns present in home: No    Seat belts used routinely: Yes    Sunscreen used routinely: No    Smoke alarm in home:  Yes Advance directive: No    Live alone or with others: with others    Are there stairs in your home: Yes    International travel: denies    Pets: No    Deaf or serious difficulty hearing: No    Blind or serious difficulty seeing: No    Difficulty concentrating, remembering or making decisions: No    Difficulty walking or climbing stairs: No    Difficulty dressing or bathing: No    Difficulty doing errands alone: No     Social Determinants of Health     Financial Resource Strain: Not on file   Food Insecurity: Not on file   Transportation Needs: Not on file   Physical Activity: Not on file   Stress: Not on file   Social Connections: Not on file   Intimate Partner Violence: Not on file   Housing Stability: Not on file      Family History:     Family History   Problem Relation Age of Onset    ALS Mother     No Known Problems Father     No Known Problems Sister     No Known Problems Sister     No Known Problems Sister     No Known Problems Sister     No Known Problems Sister     Heart disease Maternal Grandmother     Breast cancer Maternal Grandmother       Current Medications:     Current Outpatient Medications   Medication Sig Dispense Refill    cyclobenzaprine (FLEXERIL) 10 mg tablet Take 1 tablet (10 mg total) by mouth daily at bedtime 30 tablet 0     No current facility-administered medications for this visit  Allergies:     No Known Allergies   Physical Exam:     There were no vitals taken for this visit  Physical Exam  Constitutional:       Appearance: Normal appearance  HENT:      Head: Normocephalic and atraumatic  Right Ear: External ear normal       Left Ear: External ear normal       Mouth/Throat:      Mouth: Mucous membranes are moist    Eyes:      Extraocular Movements: Extraocular movements intact  Pupils: Pupils are equal, round, and reactive to light  Cardiovascular:      Rate and Rhythm: Normal rate and regular rhythm  Heart sounds: Normal heart sounds   No murmur heard       Pulmonary:      Effort: Pulmonary effort is normal       Breath sounds: Normal breath sounds  Abdominal:      General: Abdomen is flat  Palpations: Abdomen is soft  Musculoskeletal:         General: Normal range of motion  Cervical back: Normal range of motion and neck supple  Skin:     General: Skin is warm  Capillary Refill: Capillary refill takes less than 2 seconds  Neurological:      General: No focal deficit present  Mental Status: He is alert and oriented to person, place, and time  Mental status is at baseline  Psychiatric:         Mood and Affect: Mood normal          Thought Content:  Thought content normal          Judgment: Judgment normal           Keily Bryant   [facility]

## 2022-01-26 NOTE — PATIENT INSTRUCTIONS

## 2022-09-01 ENCOUNTER — OFFICE VISIT (OUTPATIENT)
Dept: FAMILY MEDICINE CLINIC | Facility: CLINIC | Age: 22
End: 2022-09-01
Payer: COMMERCIAL

## 2022-09-01 VITALS
TEMPERATURE: 97.3 F | WEIGHT: 182.13 LBS | SYSTOLIC BLOOD PRESSURE: 116 MMHG | HEIGHT: 72 IN | HEART RATE: 65 BPM | RESPIRATION RATE: 16 BRPM | DIASTOLIC BLOOD PRESSURE: 70 MMHG | OXYGEN SATURATION: 99 % | BODY MASS INDEX: 24.67 KG/M2

## 2022-09-01 DIAGNOSIS — M25.511 ACUTE PAIN OF RIGHT SHOULDER: Primary | ICD-10-CM

## 2022-09-01 PROCEDURE — 99213 OFFICE O/P EST LOW 20 MIN: CPT | Performed by: INTERNAL MEDICINE

## 2022-09-01 RX ORDER — NAPROXEN 500 MG/1
500 TABLET ORAL 2 TIMES DAILY WITH MEALS
Qty: 60 TABLET | Refills: 5 | Status: SHIPPED | OUTPATIENT
Start: 2022-09-01

## 2022-09-01 RX ORDER — BACLOFEN 10 MG/1
10 TABLET ORAL 2 TIMES DAILY
Qty: 60 TABLET | Refills: 0 | Status: SHIPPED | OUTPATIENT
Start: 2022-09-01

## 2022-09-01 NOTE — PROGRESS NOTES
Assessment/Plan:Possible shoulder issue/vs msk pain vs spasm-will Xray, and do conservative management for 7-10 days with naproxen, muscle relaxer, alternating hot and cold-suggest massage          Problem List Items Addressed This Visit    None     Visit Diagnoses     Acute pain of right shoulder    -  Primary    Relevant Medications    naproxen (Naprosyn) 500 mg tablet    baclofen 10 mg tablet    Other Relevant Orders    XR shoulder 2+ vw right            Subjective:      Patient ID: Pat Thomas is a 20 y o  male  Rakhkeem has had right sided shoulder/neck pain for several days-has trouble with rom right shoulder-no known injury, but does lift heavy things sometimes       The following portions of the patient's history were reviewed and updated as appropriate:   Past Medical History:  He has a past medical history of Anemia, Asthma, Depression, Lipoma of back, and Mass of subcutaneous tissue of back  ,  _______________________________________________________________________  Medical Problems:  does not have any pertinent problems on file ,  _______________________________________________________________________  Past Surgical History:   has a past surgical history that includes pr excision tumor soft tissue back/flank subq 3+cm (N/A, 06/01/2021)  ,  _______________________________________________________________________  Family History:  family history includes ALS in his mother; Breast cancer in his maternal grandmother; Heart disease in his maternal grandmother; No Known Problems in his father, sister, sister, sister, sister, and sister  ,  _______________________________________________________________________  Social History:   reports that he has never smoked  He has never used smokeless tobacco  He reports current alcohol use   He reports previous drug use ,  _______________________________________________________________________  Allergies:  has No Known Allergies     _______________________________________________________________________  Current Outpatient Medications   Medication Sig Dispense Refill    baclofen 10 mg tablet Take 1 tablet (10 mg total) by mouth 2 (two) times a day 60 tablet 0    naproxen (Naprosyn) 500 mg tablet Take 1 tablet (500 mg total) by mouth 2 (two) times a day with meals 60 tablet 5     No current facility-administered medications for this visit      _______________________________________________________________________  Review of Systems   Constitutional: Negative  Respiratory: Negative  Musculoskeletal: Positive for arthralgias, myalgias and neck pain  Objective: There were no vitals filed for this visit  There is no height or weight on file to calculate BMI  Physical Exam  Constitutional:       Appearance: Normal appearance  HENT:      Head: Normocephalic and atraumatic  Mouth/Throat:      Mouth: Mucous membranes are moist    Pulmonary:      Effort: Pulmonary effort is normal    Musculoskeletal:         General: Tenderness present  Normal range of motion  Comments: Right shoulder decreased ROM, tenderness,tight   Neurological:      General: No focal deficit present  Mental Status: He is alert and oriented to person, place, and time  Psychiatric:         Mood and Affect: Mood normal          Behavior: Behavior normal          Thought Content:  Thought content normal

## 2023-02-09 ENCOUNTER — OFFICE VISIT (OUTPATIENT)
Dept: FAMILY MEDICINE CLINIC | Facility: CLINIC | Age: 23
End: 2023-02-09

## 2023-02-09 VITALS
OXYGEN SATURATION: 99 % | DIASTOLIC BLOOD PRESSURE: 72 MMHG | SYSTOLIC BLOOD PRESSURE: 110 MMHG | HEART RATE: 66 BPM | TEMPERATURE: 96.1 F | HEIGHT: 72 IN | RESPIRATION RATE: 16 BRPM | BODY MASS INDEX: 25.06 KG/M2 | WEIGHT: 185 LBS

## 2023-02-09 DIAGNOSIS — Z23 ENCOUNTER FOR IMMUNIZATION: ICD-10-CM

## 2023-02-09 DIAGNOSIS — Z00.00 ENCOUNTER FOR ANNUAL PHYSICAL EXAM: Primary | ICD-10-CM

## 2023-02-09 NOTE — PROGRESS NOTES
BMI Counseling: Body mass index is 25 09 kg/m²  The BMI is above normal  Nutrition recommendations include decreasing portion sizes, encouraging healthy choices of fruits and vegetables, consuming healthier snacks and moderation in carbohydrate intake  Exercise recommendations include exercising 3-5 times per week  No pharmacotherapy was ordered  Rationale for BMI follow-up plan is due to patient being overweight or obese  Depression Screening and Follow-up Plan: Patient was screened for depression during today's encounter  They screened negative with a PHQ-2 score of 2  Assessment/Plan:         Problem List Items Addressed This Visit        Other    Encounter for annual physical exam - Primary     CPE done  Tdap given  Pt declined flu shot and COVID vaccine  Had Gardasil series  Pt advised to follow a well balanced, heart healthy diet and to exercise on a regular basis  BP good  Form done for work  Other Visit Diagnoses     Encounter for immunization        Relevant Orders    TDAP VACCINE GREATER THAN OR EQUAL TO 6YO IM            Subjective:      Patient ID: Andre Coffman is a 24 y o  male  Pt here for physical  Doing well  No cp/sob  No HA  No abd pain  Pt exercises 3 x per week  Not a smoker  Occasional ETOH  Last Tdap was in 2012  Had Gardasil series  Doesn't want flu shot or COVID vaccine  Needs form done for work  The following portions of the patient's history were reviewed and updated as appropriate:   Past Medical History:  He has a past medical history of Anemia, Asthma, Depression, Lipoma of back, and Mass of subcutaneous tissue of back  ,  _______________________________________________________________________  Medical Problems:  does not have any pertinent problems on file ,  _______________________________________________________________________  Past Surgical History:   has a past surgical history that includes pr excision tumor soft tis back/flank subq 3 cm/> (N/A, 06/01/2021)  ,  _______________________________________________________________________  Family History:  family history includes ALS in his mother; Breast cancer in his maternal grandmother; Heart disease in his maternal grandmother; No Known Problems in his father, sister, sister, sister, sister, and sister  ,  _______________________________________________________________________  Social History:   reports that he has never smoked  He has never used smokeless tobacco  He reports current alcohol use  He reports that he does not currently use drugs  ,  _______________________________________________________________________  Allergies:  has No Known Allergies     _______________________________________________________________________  No current outpatient medications on file  No current facility-administered medications for this visit      _______________________________________________________________________  Review of Systems   Constitutional: Negative for activity change, appetite change, fatigue and unexpected weight change  HENT: Negative for congestion, ear pain, rhinorrhea, sore throat and trouble swallowing  Eyes: Negative for pain, discharge and visual disturbance  Respiratory: Negative for cough, shortness of breath and wheezing  Cardiovascular: Negative for chest pain, palpitations and leg swelling  Gastrointestinal: Negative for abdominal pain, constipation, diarrhea, nausea and vomiting  Endocrine: Negative for polydipsia, polyphagia and polyuria  Genitourinary: Negative for difficulty urinating and hematuria  Musculoskeletal: Negative for arthralgias, back pain, gait problem, myalgias and neck pain  Skin: Negative for color change and rash  Neurological: Negative for dizziness, weakness and headaches  Hematological: Negative for adenopathy  Does not bruise/bleed easily  Psychiatric/Behavioral: Negative for dysphoric mood and sleep disturbance   The patient is not nervous/anxious  Objective:  Vitals:    02/09/23 1106   BP: 110/72   BP Location: Left arm   Patient Position: Sitting   Cuff Size: Standard   Pulse: 66   Resp: 16   Temp: (!) 96 1 °F (35 6 °C)   TempSrc: Tympanic   SpO2: 99%   Weight: 83 9 kg (185 lb)   Height: 6' (1 829 m)     Body mass index is 25 09 kg/m²  Physical Exam  Vitals and nursing note reviewed  Constitutional:       Appearance: Normal appearance  He is well-developed and normal weight  HENT:      Head: Normocephalic and atraumatic  Right Ear: Tympanic membrane, ear canal and external ear normal       Left Ear: Tympanic membrane, ear canal and external ear normal       Nose: Nose normal       Mouth/Throat:      Mouth: Mucous membranes are moist       Pharynx: Oropharynx is clear  No posterior oropharyngeal erythema  Eyes:      Conjunctiva/sclera: Conjunctivae normal       Pupils: Pupils are equal, round, and reactive to light  Neck:      Thyroid: No thyromegaly  Cardiovascular:      Rate and Rhythm: Normal rate and regular rhythm  Heart sounds: Normal heart sounds  No murmur heard  Pulmonary:      Effort: Pulmonary effort is normal       Breath sounds: Normal breath sounds  No wheezing or rales  Abdominal:      General: Bowel sounds are normal  There is no distension  Palpations: Abdomen is soft  There is no mass  Tenderness: There is no abdominal tenderness  Musculoskeletal:         General: No deformity  Normal range of motion  Cervical back: Normal range of motion and neck supple  Right lower leg: No edema  Left lower leg: No edema  Lymphadenopathy:      Cervical: No cervical adenopathy  Skin:     General: Skin is warm and dry  Capillary Refill: Capillary refill takes less than 2 seconds  Findings: No erythema or rash  Neurological:      General: No focal deficit present  Mental Status: He is alert and oriented to person, place, and time  Mental status is at baseline  Cranial Nerves: No cranial nerve deficit  Sensory: No sensory deficit  Motor: No abnormal muscle tone  Coordination: Coordination normal    Psychiatric:         Mood and Affect: Mood normal          Behavior: Behavior normal          Thought Content:  Thought content normal          Judgment: Judgment normal

## 2023-02-09 NOTE — ASSESSMENT & PLAN NOTE
CPE done  Tdap given  Pt declined flu shot and COVID vaccine  Had Gardasil series  Pt advised to follow a well balanced, heart healthy diet and to exercise on a regular basis  BP good  Form done for work

## 2024-02-11 ENCOUNTER — HOSPITAL ENCOUNTER (EMERGENCY)
Facility: HOSPITAL | Age: 24
Discharge: HOME | End: 2024-02-11
Attending: EMERGENCY MEDICINE
Payer: COMMERCIAL

## 2024-02-11 VITALS
OXYGEN SATURATION: 100 % | HEART RATE: 59 BPM | SYSTOLIC BLOOD PRESSURE: 157 MMHG | TEMPERATURE: 98.4 F | BODY MASS INDEX: 23.7 KG/M2 | WEIGHT: 175 LBS | RESPIRATION RATE: 20 BRPM | DIASTOLIC BLOOD PRESSURE: 93 MMHG | HEIGHT: 72 IN

## 2024-02-11 DIAGNOSIS — K08.89 PAIN, DENTAL: Primary | ICD-10-CM

## 2024-02-11 PROCEDURE — 99282 EMERGENCY DEPT VISIT SF MDM: CPT

## 2024-02-11 PROCEDURE — 99284 EMERGENCY DEPT VISIT MOD MDM: CPT | Performed by: EMERGENCY MEDICINE

## 2024-02-11 RX ORDER — AMOXICILLIN 500 MG/1
500 CAPSULE ORAL 3 TIMES DAILY
Qty: 21 CAPSULE | Refills: 0 | Status: SHIPPED | OUTPATIENT
Start: 2024-02-11 | End: 2024-02-18

## 2024-02-11 RX ORDER — ACETAMINOPHEN 325 MG/1
650 TABLET ORAL ONCE
Status: DISCONTINUED | OUTPATIENT
Start: 2024-02-11 | End: 2024-02-11 | Stop reason: HOSPADM

## 2024-02-11 RX ORDER — AMOXICILLIN 250 MG/1
500 CAPSULE ORAL ONCE
Status: DISCONTINUED | OUTPATIENT
Start: 2024-02-11 | End: 2024-02-11 | Stop reason: HOSPADM

## 2024-02-11 NOTE — ED PROVIDER NOTES
History  Chief Complaint   Patient presents with    Dental Pain     Pt reports a few days of right sided dental pain. Pt reports taking motrin and tylenol today with no relief. Pt reports he has a dentist to follow up with      This is a 24-year-old male who presents to the emergency department complaining of dental pain.  The patient states he has had 3 days of right-sided dental pain.  Is on the top and the bottom of his jaw.  He states that the pain is severe.  Nothing makes it better or worse.  He has tried Tylenol and Motrin.  His last dose of ibuprofen was a few hours ago.  The patient denies difficulty swallowing.  He denies difficulty opening or closing his mouth.          None       Past Medical History:   Diagnosis Date    Anemia     Asthma     Depression     Lipoma of back     Mass of subcutaneous tissue of back        Past Surgical History:   Procedure Laterality Date    DC EXCISION TUMOR SOFT TIS BACK/FLANK SUBQ 3 CM/> N/A 06/01/2021    Procedure: EXCISION SUBMUSCULAR LIPOMA BACK;  Surgeon: Robert Bloch, MD;  Location:  MAIN OR;  Service: General       Family History   Problem Relation Age of Onset    ALS Mother     No Known Problems Father     No Known Problems Sister     No Known Problems Sister     No Known Problems Sister     No Known Problems Sister     No Known Problems Sister     Heart disease Maternal Grandmother     Breast cancer Maternal Grandmother      I have reviewed and agree with the history as documented.    E-Cigarette/Vaping    E-Cigarette Use Current Every Day User      E-Cigarette/Vaping Substances    Nicotine Yes     THC Yes     CBD No     Flavoring Yes     Other No     Unknown No      Social History     Tobacco Use    Smoking status: Never    Smokeless tobacco: Never   Vaping Use    Vaping status: Every Day    Substances: Nicotine, THC, Flavoring   Substance Use Topics    Alcohol use: Yes     Comment: occassional    Drug use: Not Currently     Types: Marijuana     Comment: none        Review of Systems   All other systems reviewed and are negative.      Physical Exam  Physical Exam  Constitutional: Vital signs reviewed, patient well-appearing, nontoxic  Eyes: Extraocular movements intact   HEENT: Trachea midline, no JVD, moist mucous membranes, no trismus, no surrounding lymphadenopathy, no gingival fluctuance, no gingival erythema, tenderness to the right side of the mouth on the upper and lower teeth  Respiratory: Equal chest expansion   Cardiovascular: Well perfused   Abdomen: No distension   Extremities: No edema   Neuro: awake, alert, oriented, no focal weakness   Skin: warm, dry, intact, no rashes noted       Vital Signs  ED Triage Vitals   Temperature Pulse Respirations Blood Pressure SpO2   02/11/24 1504 02/11/24 1445 02/11/24 1445 02/11/24 1445 02/11/24 1445   98.4 °F (36.9 °C) 59 20 157/93 100 %      Temp src Heart Rate Source Patient Position - Orthostatic VS BP Location FiO2 (%)   -- 02/11/24 1445 02/11/24 1445 02/11/24 1445 --    Monitor Sitting Right arm       Pain Score       02/11/24 1445       10 - Worst Possible Pain           Vitals:    02/11/24 1445   BP: 157/93   Pulse: 59   Patient Position - Orthostatic VS: Sitting         Visual Acuity      ED Medications  Medications   acetaminophen (TYLENOL) tablet 650 mg (650 mg Oral Not Given 2/11/24 1505)   amoxicillin (AMOXIL) capsule 500 mg (500 mg Oral Not Given 2/11/24 1506)       Diagnostic Studies  Results Reviewed       None                   No orders to display              Procedures  Procedures         ED Course                               SBIRT 20yo+      Flowsheet Row Most Recent Value   Initial Alcohol Screen: US AUDIT-C     1. How often do you have a drink containing alcohol? 0 Filed at: 02/11/2024 1445   2. How many drinks containing alcohol do you have on a typical day you are drinking?  0 Filed at: 02/11/2024 1445   3a. Male UNDER 65: How often do you have five or more drinks on one occasion? 0 Filed at:  02/11/2024 1445   3b. FEMALE Any Age, or MALE 65+: How often do you have 4 or more drinks on one occassion? 0 Filed at: 02/11/2024 1445   Audit-C Score 0 Filed at: 02/11/2024 1445   CLAUDINE: How many times in the past year have you...    Used an illegal drug or used a prescription medication for non-medical reasons? Never Filed at: 02/11/2024 1445                      Medical Decision Making  This is a 24-year-old male who presented to the emergency department complaining of dental pain.  I considered dental fracture, dental infection. These and other diagnoses were considered.     The patient was well-appearing on exam.  Although he complained of significant pain he had no gingival swelling or fluctuance noted on exam.  The patient had no trismus.  The patient was given antibiotics and Tylenol for pain and discharged with follow-up to a dentist.    Risk  OTC drugs.  Prescription drug management.             Disposition  Final diagnoses:   Pain, dental     Time reflects when diagnosis was documented in both MDM as applicable and the Disposition within this note       Time User Action Codes Description Comment    2/11/2024  3:00 PM Omar Myers Add [K08.89] Pain, dental           ED Disposition       ED Disposition   Discharge    Condition   Stable    Date/Time   Sun Feb 11, 2024 1501    Comment   Juan Francisco Spence discharge to home/self care.                   Follow-up Information       Follow up With Specialties Details Why Contact Info Additional Information    Ruma Goodson MD Internal Medicine, Pediatrics In 2 days  [address]  [address]  [facility] [address]  [phone]       [facility] Emergency Department Emergency Medicine  If symptoms worsen [address] [address]  [phone] [facility] Emergency Department, [address] [address]    [facility]    [address] [facility]  [address]  [facility] [address]  [phone]             Discharge Medication List as of 2/11/2024  3:01 PM        START taking these medications    Details   amoxicillin (AMOXIL) 500 mg capsule Take 1 capsule (500 mg total) by mouth 3 (three) times a day for 7 days, Starting Sun 2/11/2024, Until Sun 2/18/2024, Normal             No discharge procedures on file.    PDMP Review         Value Time User    PDMP Reviewed  Yes 6/1/2021  9:18 AM Robert Bloch, MD            ED Provider  Electronically Signed by             Omar Myers DO  02/11/24 1504

## 2024-07-02 ENCOUNTER — HOSPITAL ENCOUNTER (EMERGENCY)
Facility: HOSPITAL | Age: 24
End: 2024-07-03
Attending: EMERGENCY MEDICINE | Admitting: EMERGENCY MEDICINE
Payer: COMMERCIAL

## 2024-07-02 VITALS
SYSTOLIC BLOOD PRESSURE: 141 MMHG | RESPIRATION RATE: 20 BRPM | DIASTOLIC BLOOD PRESSURE: 92 MMHG | TEMPERATURE: 97.6 F | HEART RATE: 68 BPM | OXYGEN SATURATION: 100 %

## 2024-07-02 DIAGNOSIS — R45.851 DEPRESSION WITH SUICIDAL IDEATION: Primary | ICD-10-CM

## 2024-07-02 DIAGNOSIS — F32.A DEPRESSION WITH SUICIDAL IDEATION: Primary | ICD-10-CM

## 2024-07-02 DIAGNOSIS — Z91.89 HAS ACCESS TO FIREARM: ICD-10-CM

## 2024-07-02 LAB
AMPHETAMINES SERPL QL SCN: NEGATIVE
BARBITURATES UR QL: NEGATIVE
BENZODIAZ UR QL: NEGATIVE
COCAINE UR QL: NEGATIVE
ETHANOL EXG-MCNC: 0.05 MG/DL
FENTANYL UR QL SCN: NEGATIVE
HYDROCODONE UR QL SCN: NEGATIVE
METHADONE UR QL: NEGATIVE
OPIATES UR QL SCN: NEGATIVE
OXYCODONE+OXYMORPHONE UR QL SCN: NEGATIVE
PCP UR QL: NEGATIVE
THC UR QL: NEGATIVE

## 2024-07-02 PROCEDURE — 80307 DRUG TEST PRSMV CHEM ANLYZR: CPT | Performed by: EMERGENCY MEDICINE

## 2024-07-02 PROCEDURE — 82075 ASSAY OF BREATH ETHANOL: CPT | Performed by: EMERGENCY MEDICINE

## 2024-07-02 PROCEDURE — 99285 EMERGENCY DEPT VISIT HI MDM: CPT | Performed by: EMERGENCY MEDICINE

## 2024-07-02 PROCEDURE — 99285 EMERGENCY DEPT VISIT HI MDM: CPT

## 2024-07-03 NOTE — ED NOTES
302 was petitioned by Sharon Vail while patient was in the ED as he was not originally in agreement with treatment.     Patient signed a 201.     Converted 302/201 emailed to [facility].

## 2024-07-03 NOTE — ED NOTES
"Patient is a 24 year old male who presented to the ED via EMS after texting a friend stating he wanted to harm himself. Patient was found by police asleep/passed out on the couch with gun on his chest. CIS met with patient. Patient was tearful and appears depressed and has a flat affect. Patient is currently endorsing SI with a plan to shoot himself. Patient reports wanting to die and not wanting help. Pt reports feeling as though his life is not worth getting help. Patient provided somewhat limited information. Patient reports he owns 1 firearm which is not locked away. Patient reports he has been depressed and has had suicidal thoughts for years, but never acted on it until today. When asked, patient did not identify any recent life changes/social stressors other than depression. Patient reports anhedonia and hopelessness. Patient denies having any current OP providers or medication. Patient indicates that he was in therapy as a kid. Patient reports poor sleep, states he \"never sleeps\" and reports that has been going on for awhile. Patient also reports a poor appetite, eating about 1 meal a day. Patient denies HI/A/VH/paranoia and does not display any signs of psychosis. Patient has poor insight/judgement at this time. Patient reports drinking socially on weekends, ocassionally, and also indicates smoking marijuana regularly.     Additional collateral was obtained by friend, Gareth, and friends mother, Sharon Vail and her , regarding patients history and upbringing in the foster care system, as well as recent legal issues. ( See other note)     CIS discussed inpatient treatment with patient. Pt was originally not in agreement with signing the 201. Patient was informed of the need for treatment whether that is voluntary or involuntary, as 302 has been petitioned and ED MD willing to uphold.    Patient signed a 201. Rights and 72 hours notice given. Patient had no questions. Family friends were updated and " went in to speak with patient. They appear supportive.

## 2024-07-03 NOTE — ED NOTES
Eligibility Verification System checked - ([phone]).  Online system / automated system indicates: active with [facility]

## 2024-07-03 NOTE — ED PROVIDER NOTES
"History  Chief Complaint   Patient presents with    Psychiatric Evaluation     Arrived via ems after police was called; pt was drinking and sent a text to a friend stating he wanted to harm himself. Pt was found by police passed out on couch with a gun on his chest.     23 y/o male presents to the ED via EMS in police for psychiatric evaluation of depression and suicidal ideation with near attempt.  Per EMS and police report, the police  were contacted by his foster family to perform a wellness check after they received a text message from him verbalizing feelings of longstanding depression that he has been hiding and \"to please let me go in peace\" (images attached in media file of text messages, provided by the patient's family).  The police found the patient sleeping on a sofa in his residence, with a loaded firearm on the sofa next to him.  There were also several partially empty cans of beer nearby.  The patient admits to drinking earlier tonight and states that he had plan to shoot himself in the head with intent to carry out his suicide tonight.  He states that he has been having depression since his biological mother  from Gladys Gehrig's disease when he was 10 years old and has been hiding his depression for many years.  He states that \"nobody can help me, I just want to die.\"  Patient reports no physical symptoms or complaints.  No prior history of hospitalization for mental health care.  He denies any homicidal ideation or hallucinations.                      None       Past Medical History:   Diagnosis Date    Anemia     Asthma     Depression     Lipoma of back     Mass of subcutaneous tissue of back        Past Surgical History:   Procedure Laterality Date    MI EXCISION TUMOR SOFT TIS BACK/FLANK SUBQ 3 CM/> N/A 2021    Procedure: EXCISION SUBMUSCULAR LIPOMA BACK;  Surgeon: Robert Bloch, MD;  Location:  MAIN OR;  Service: General       Family History   Problem Relation Age of Onset    ALS Mother "     No Known Problems Father     No Known Problems Sister     No Known Problems Sister     No Known Problems Sister     No Known Problems Sister     No Known Problems Sister     Heart disease Maternal Grandmother     Breast cancer Maternal Grandmother      I have reviewed and agree with the history as documented.    E-Cigarette/Vaping    E-Cigarette Use Current Every Day User      E-Cigarette/Vaping Substances    Nicotine Yes     THC Yes     CBD No     Flavoring Yes     Other No     Unknown No      Social History     Tobacco Use    Smoking status: Never    Smokeless tobacco: Never   Vaping Use    Vaping status: Every Day    Substances: Nicotine, THC, Flavoring   Substance Use Topics    Alcohol use: Yes     Comment: occassional    Drug use: Not Currently     Types: Marijuana     Comment: none       Review of Systems   Constitutional:  Negative for chills and fever.   HENT:  Negative for congestion, rhinorrhea and sore throat.    Respiratory:  Negative for cough and shortness of breath.    Cardiovascular:  Negative for chest pain and palpitations.   Gastrointestinal:  Negative for abdominal pain, diarrhea, nausea and vomiting.   Genitourinary:  Negative for dysuria and hematuria.   Musculoskeletal:  Negative for back pain and neck pain.   Neurological:  Negative for weakness, light-headedness, numbness and headaches.   Psychiatric/Behavioral:  Positive for dysphoric mood and suicidal ideas. Negative for hallucinations.         See HPI   All other systems reviewed and are negative.      Physical Exam  Physical Exam  Vitals and nursing note reviewed.   Constitutional:       General: He is not in acute distress.     Appearance: Normal appearance. He is normal weight.   HENT:      Head: Normocephalic and atraumatic.      Right Ear: External ear normal.      Left Ear: External ear normal.      Nose: Nose normal.      Mouth/Throat:      Mouth: Mucous membranes are moist.      Pharynx: Oropharynx is clear. No oropharyngeal  exudate or posterior oropharyngeal erythema.   Eyes:      Extraocular Movements: Extraocular movements intact.      Conjunctiva/sclera: Conjunctivae normal.      Pupils: Pupils are equal, round, and reactive to light.   Cardiovascular:      Rate and Rhythm: Normal rate and regular rhythm.      Pulses: Normal pulses.      Heart sounds: Normal heart sounds.   Pulmonary:      Effort: Pulmonary effort is normal. No respiratory distress.      Breath sounds: Normal breath sounds. No wheezing or rales.   Abdominal:      General: Abdomen is flat. Bowel sounds are normal. There is no distension.      Palpations: Abdomen is soft.      Tenderness: There is no abdominal tenderness. There is no right CVA tenderness, left CVA tenderness or guarding.   Musculoskeletal:         General: No swelling or tenderness. Normal range of motion.      Cervical back: Normal range of motion and neck supple. No tenderness.   Skin:     General: Skin is warm and dry.   Neurological:      General: No focal deficit present.      Mental Status: He is alert and oriented to person, place, and time.   Psychiatric:      Comments: Markedly depressed affect, withdrawn, intermittently tearful.  Verbalizes suicidal ideation with plan to shoot himself and he was found with a firearm beside him tonight.  No homicidal ideation or hallucinations.         Vital Signs  ED Triage Vitals   Temperature Pulse Respirations Blood Pressure SpO2   07/02/24 2118 07/02/24 2118 07/02/24 2118 07/02/24 2118 07/02/24 2118   97.6 °F (36.4 °C) 83 18 144/99 99 %      Temp Source Heart Rate Source Patient Position - Orthostatic VS BP Location FiO2 (%)   07/02/24 2118 07/02/24 2118 07/02/24 2118 07/02/24 2118 --   Oral Monitor Lying Left arm       Pain Score       07/03/24 0700       No Pain           Vitals:    07/02/24 2118 07/02/24 2232   BP: 144/99 141/92   Pulse: 83 68   Patient Position - Orthostatic VS: Lying Lying         Visual Acuity      ED Medications  Medications - No  data to display    Diagnostic Studies  Results Reviewed       Procedure Component Value Units Date/Time    Rapid drug screen, urine [[id number]]  (Normal) Collected: 07/02/24 2127    Lab Status: Final result Specimen: Urine, Clean Catch Updated: 07/02/24 2159     Amph/Meth UR Negative     Barbiturate Ur Negative     Benzodiazepine Urine Negative     Cocaine Urine Negative     Methadone Urine Negative     Opiate Urine Negative     PCP Ur Negative     THC Urine Negative     Oxycodone Urine Negative     Fentanyl Urine Negative     HYDROCODONE URINE Negative    Narrative:      FOR MEDICAL PURPOSES ONLY.   IF CONFIRMATION NEEDED PLEASE CONTACT THE LAB WITHIN 5 DAYS.    Drug Screen Cutoff Levels:  AMPHETAMINE/METHAMPHETAMINES  1000 ng/mL  BARBITURATES     200 ng/mL  BENZODIAZEPINES     200 ng/mL  COCAINE      300 ng/mL  METHADONE      300 ng/mL  OPIATES      300 ng/mL  PHENCYCLIDINE     25 ng/mL  THC       50 ng/mL  OXYCODONE      100 ng/mL  FENTANYL      5 ng/mL  HYDROCODONE     300 ng/mL    POCT alcohol breath test [[id number]]  (Normal) Resulted: 07/02/24 2122    Lab Status: Final result Updated: 07/02/24 2122     EXTBreath Alcohol 0.046                   No orders to display              Procedures  Procedures         ED Course  ED Course as of 07/03/24 0754   Tue Jul 02, 2024 2236 EXTBreath Alcohol: 0.046   2236 Rapid drug screen, urine  All negative                               SBIRT 22yo+      Flowsheet Row Most Recent Value   Initial Alcohol Screen: US AUDIT-C     1. How often do you have a drink containing alcohol? 0 Filed at: 07/02/2024 2121   2. How many drinks containing alcohol do you have on a typical day you are drinking?  0 Filed at: 07/02/2024 2121   3a. Male UNDER 65: How often do you have five or more drinks on one occasion? 0 Filed at: 07/02/2024 2121   3b. FEMALE Any Age, or MALE 65+: How often do you have 4 or more drinks on one occassion? 0 Filed at: 07/02/2024 2121   Audit-C Score 0 Filed at:  "2024   CLAUDINE: How many times in the past year have you...    Used an illegal drug or used a prescription medication for non-medical reasons? Never Filed at: 2024                      Medical Decision Making  23 y/o male presents to the ED via EMS in police for psychiatric evaluation of depression and suicidal ideation with near attempt.  Per EMS and police report, the police  were contacted by his foster family to perform a wellness check after they received a text message from him verbalizing feelings of longstanding depression that he has been hiding and \"to please let me go in peace\" (images attached in media file of text messages, provided by the patient's family).  The police found the patient sleeping on a sofa in his residence, with a loaded firearm on the sofa next to him.  There were also several partially empty cans of beer nearby.  The patient admits to drinking earlier tonight and states that he had plan to shoot himself in the head with intent to carry out his suicide tonight.  He states that he has been having depression since his biological mother  from Gladys Gehrig's disease when he was 10 years old and has been hiding his depression for many years.  He states that \"nobody can help me, I just want to die.\"  Patient reports no physical symptoms or complaints.  No prior history of hospitalization for mental health care.  He denies any homicidal ideation or hallucinations.    Vital signs reviewed.  See physical exam documentation for exam findings.  The patient appears to have features of severe depression with suicidal ideation and intent and almost attempted suicide tonight with a firearm that was found beside him on wellness check by police initiated by his family.  I discussed with the patient and he initially was refusing to sign in voluntarily and the plan was for involuntary admission petition by the patient's family, however the patient discussed with his foster family and " they encouraged him to sign voluntarily for treatment and the patient agreed to sign for 201 voluntary admission.  Patient medically cleared for behavioral health evaluation.  Patient accepted to [facility] and ROBERT completed and signed. Transport ETA 0730.    Amount and/or Complexity of Data Reviewed  Labs: ordered. Decision-making details documented in ED Course.    Risk  Decision regarding hospitalization.             Disposition  Final diagnoses:   Depression with suicidal ideation   Has access to firearm     Time reflects when diagnosis was documented in both MDM as applicable and the Disposition within this note       Time User Action Codes Description Comment    7/2/2024 11:09 PM Josh Lisa [F32.A,  R45.851] Depression with suicidal ideation     7/2/2024 11:09 PM Josh Lisa [Z91.89] Has access to firearm           ED Disposition       ED Disposition   Transfer to Behavioral Health    Condition   --    Date/Time   Tue Jul 2, 2024 1832    Comment   Juan Francisco Wilkes Dominick Spence should be transferred out to Behavioral Health and has been medically cleared.               MD Documentation      Flowsheet Row Most Recent Value   Patient Condition The patient has been stabilized such that within reasonable medical probability, no material deterioration of the patient condition or the condition of the unborn child(lynne) is likely to result from the transfer   Reason for Transfer Level of Care needed not available at this facility   Benefits of Transfer Specialized equipment and/or services available at the receiving facility (Include comment)________________________   Risks of Transfer Potential for delay in receiving treatment, Potential deterioration of medical condition, Increased discomfort during transfer, Possible worsening of condition or death during transfer   Accepting Physician Dr. Yeyo Lugo   Accepting Facility Name, [facility] [place], [place]     (Name & Tel number) Shauna CASEY,  [phone]   Sending MD Dr. Josh Lisa   Provider Certification General risk, such as traffic hazards, adverse weather conditions, rough terrain or turbulence, possible failure of equipment (including vehicle or aircraft), or consequences of actions of persons outside the control of the transport personnel, Unanticipated needs of medical equipment and personnel during transport, Risk of worsening condition, The possibility of a transport vehicle being unavailable, The patient is stable for psychiatric transfer because they are medically stable, and is protected from harming him/herself or others during transport          RN Documentation      Flowsheet Row Most Recent Value   Accepting Facility Name, [facility] [place], [place]    (Name & Tel number) Shauna HChai,  [phone]          Follow-up Information    None         Patient's Medications    No medications on file       No discharge procedures on file.    PDMP Review         Value Time User    PDMP Reviewed  Yes 6/1/2021  9:18 AM Robert Bloch, MD            ED Provider  Electronically Signed by             Josh Lisa MD  07/03/24 0756

## 2024-07-03 NOTE — ED NOTES
Chart reviewed.  SDM met 0730 to [facility] (They arrived at 0755).  Met with patient.  Introduced self and role.  Patient was sleeping the woken with verbal commands.  Patient was calm and signed the EMTALA. Addressed all questions. Prepped transfer packet.     Updated [place] of ETA and insurance details. S/w Lele in Admissions.  Lele advised no RN report prior to transfer is necessary due to no clinical changes occurring or meds given, though to call if changes occur.

## 2024-07-03 NOTE — ED NOTES
Bed search initiated at this time.    [facility]- male beds available  [facility]- male beds available    Clinical faxed to [facility] and [facility]. Bed search to continue if needed.

## 2024-07-03 NOTE — ED NOTES
Patient is accepted at [facility].  Patient is accepted by Dr. Yeyo Lugo per [facility].     Transportation is arranged with [facility].     Transportation is scheduled for 7:30AM with SDM.   Patient may go to the floor at anytime.          Nurse report is not needed prior to patient transfer.        Insurance Authorization for admission:   Phone call placed to [facility].  Phone number: [phone].     Spoke to [facility].     6 days approved.  Level of care: IP Psych 201.  Review on 7/8.   Authorization # upon arrival to treating facility.

## 2024-07-03 NOTE — ED NOTES
Per Shauna Daniels request; this RN faxed over  to [facility].      Ayesha Decker, AMY  07/03/24 0020

## 2024-07-03 NOTE — ED NOTES
Provider Maria L called to bedside,  pt hyperventilating and refusing to speak, Nurse attempt to decrease stimuli, having patient take deep breaths and instructed to slow down breathing. Pt does follow commands, pt placed on continuous pulse ox. In person 1;1 still on going.     Brianna Chen RN  07/02/24 5331

## 2024-07-03 NOTE — EMTALA/ACUTE CARE TRANSFER
[facility] EMERGENCY DEPARTMENT  [address] [place] [id number]  Dept: [phone]      EMTALA TRANSFER CONSENT    NAME Juan Francisco Spence                                         2000                              MRN [id number]    I have been informed of my rights regarding examination, treatment, and transfer   by Dr. Josh Lisa MD    Benefits: Specialized equipment and/or services available at the receiving facility (Include comment)________________________    Risks: Potential for delay in receiving treatment, Potential deterioration of medical condition, Increased discomfort during transfer, Possible worsening of condition or death during transfer      Consent for Transfer:  I acknowledge that my medical condition has been evaluated and explained to me by the emergency department physician or other qualified medical person and/or my attending physician, who has recommended that I be transferred to the service of  Accepting Physician: Dr. Yeyo Lugo at Accepting Facility Name, City & State : [place], [place]. The above potential benefits of such transfer, the potential risks associated with such transfer, and the probable risks of not being transferred have been explained to me, and I fully understand them.  The doctor has explained that, in my case, the benefits of transfer outweigh the risks.  I agree to be transferred.    I authorize the performance of emergency medical procedures and treatments upon me in both transit and upon arrival at the receiving facility.  Additionally, I authorize the release of any and all medical records to the receiving facility and request they be transported with me, if possible.  I understand that the safest mode of transportation during a medical emergency is an ambulance and that the Hospital advocates the use of this mode of transport. Risks of traveling to the receiving facility by car, including absence of  medical control, life sustaining equipment, such as oxygen, and medical personnel has been explained to me and I fully understand them.    (MARCELL CORRECT BOX BELOW)  [  ]  I consent to the stated transfer and to be transported by ambulance/helicopter.  [  ]  I consent to the stated transfer, but refuse transportation by ambulance and accept full responsibility for my transportation by car.  I understand the risks of non-ambulance transfers and I exonerate the Hospital and its staff from any deterioration in my condition that results from this refusal.    X___________________________________________    DATE  24  TIME________  Signature of patient or legally responsible individual signing on patient behalf           RELATIONSHIP TO PATIENT_________________________          Provider Certification    NAME Juan Francisco Spence                                         2000                              MRN [id number]    A medical screening exam was performed on the above named patient.  Based on the examination:    Condition Necessitating Transfer The primary encounter diagnosis was Depression with suicidal ideation. A diagnosis of Has access to firearm was also pertinent to this visit.    Patient Condition: The patient has been stabilized such that within reasonable medical probability, no material deterioration of the patient condition or the condition of the unborn child(lynne) is likely to result from the transfer    Reason for Transfer: Level of Care needed not available at this facility    Transfer Requirements: Facility [place], [place]   Space available and qualified personnel available for treatment as acknowledged by Shauna CASEY; [phone]  Agreed to accept transfer and to provide appropriate medical treatment as acknowledged by       Dr. Yeyo Lugo  Appropriate medical records of the examination and treatment of the patient are provided at the time of transfer   STAFF  INITIAL WHEN COMPLETED _______  Transfer will be performed by qualified personnel from    and appropriate transfer equipment as required, including the use of necessary and appropriate life support measures.    Provider Certification: I have examined the patient and explained the following risks and benefits of being transferred/refusing transfer to the patient/family:  General risk, such as traffic hazards, adverse weather conditions, rough terrain or turbulence, possible failure of equipment (including vehicle or aircraft), or consequences of actions of persons outside the control of the transport personnel, Unanticipated needs of medical equipment and personnel during transport, Risk of worsening condition, The possibility of a transport vehicle being unavailable, The patient is stable for psychiatric transfer because they are medically stable, and is protected from harming him/herself or others during transport      Based on these reasonable risks and benefits to the patient and/or the unborn child(lynne), and based upon the information available at the time of the patient’s examination, I certify that the medical benefits reasonably to be expected from the provision of appropriate medical treatments at another medical facility outweigh the increasing risks, if any, to the individual’s medical condition, and in the case of labor to the unborn child, from effecting the transfer.    X____________________________________________ DATE 07/03/24        TIME_______      ORIGINAL - SEND TO MEDICAL RECORDS   COPY - SEND WITH PATIENT DURING TRANSFER

## 2024-07-03 NOTE — ED NOTES
"CIS spoke with patients friend, Gareth, and friends parents, (Sharon Vail and her )  whom patient lived with from the age of 17 until 3 months ago. All present in ED family waiting room.    Family shared that patients mother passed away when pt was 10 years old. Patient had 5 siblings and his grandmother tried to take care of him but it was too much for grandmother. Patient was then reportedly placed in foster care and went to the [facility] at [place]. At 17 years old, patient reportedly decided he did not want to be in the system anymore and began staying with his best friend whom he grew up with, Gareth, and his parents.. Patient recently moved out into an apartment on his own, 3 months ago, and lives alone.  They report that the apartment is funded by a voucher/program from foster care, similar to section 8.     They shared that a major life stressor for patient is that in December, patient was in [place] with friends and got charged with a DUI. He was supposed to do an LINN program and pay fines but was not compliant with these things and as a result has a court date on 7/17 and Gareth states patient has to do 3 days half-way time.    Gareth was the one who received the text messages from patient today, which include patient saying goodbye, mentioning his depression, and feeling tired of faking being happy. These messages were uploaded into \"Media\" by ED MD.     The firearm has been taken/secured by police department.     Sharon Vail petitioned the 302 with [place].   "

## 2024-07-09 ENCOUNTER — OFFICE VISIT (OUTPATIENT)
Dept: FAMILY MEDICINE CLINIC | Facility: CLINIC | Age: 24
End: 2024-07-09
Payer: COMMERCIAL

## 2024-07-09 VITALS
OXYGEN SATURATION: 97 % | HEART RATE: 94 BPM | HEIGHT: 72 IN | BODY MASS INDEX: 23.84 KG/M2 | WEIGHT: 176 LBS | TEMPERATURE: 97.2 F | SYSTOLIC BLOOD PRESSURE: 120 MMHG | DIASTOLIC BLOOD PRESSURE: 100 MMHG | RESPIRATION RATE: 16 BRPM

## 2024-07-09 DIAGNOSIS — Z51.81 ENCOUNTER FOR MEDICATION MONITORING: ICD-10-CM

## 2024-07-09 DIAGNOSIS — R53.83 OTHER FATIGUE: ICD-10-CM

## 2024-07-09 DIAGNOSIS — F33.9 EPISODE OF RECURRENT MAJOR DEPRESSIVE DISORDER, UNSPECIFIED DEPRESSION EPISODE SEVERITY (HCC): ICD-10-CM

## 2024-07-09 DIAGNOSIS — R45.851 SUICIDAL IDEATION: ICD-10-CM

## 2024-07-09 DIAGNOSIS — Z13.220 LIPID SCREENING: ICD-10-CM

## 2024-07-09 DIAGNOSIS — F41.9 ANXIETY: Primary | ICD-10-CM

## 2024-07-09 PROBLEM — J45.20 MILD INTERMITTENT ASTHMA: Status: ACTIVE | Noted: 2024-07-09

## 2024-07-09 PROCEDURE — 99204 OFFICE O/P NEW MOD 45 MIN: CPT | Performed by: INTERNAL MEDICINE

## 2024-07-09 RX ORDER — QUETIAPINE FUMARATE 50 MG/1
50 TABLET, FILM COATED ORAL
COMMUNITY
End: 2024-07-09 | Stop reason: SDUPTHER

## 2024-07-09 RX ORDER — QUETIAPINE FUMARATE 50 MG/1
50 TABLET, FILM COATED ORAL
Qty: 30 TABLET | Refills: 3 | Status: SHIPPED | OUTPATIENT
Start: 2024-07-09

## 2024-07-09 RX ORDER — MIRTAZAPINE 15 MG/1
15 TABLET, FILM COATED ORAL
COMMUNITY
End: 2024-07-09 | Stop reason: SDUPTHER

## 2024-07-09 RX ORDER — MIRTAZAPINE 15 MG/1
15 TABLET, FILM COATED ORAL
Qty: 30 TABLET | Refills: 3 | Status: SHIPPED | OUTPATIENT
Start: 2024-07-09

## 2024-07-09 NOTE — PROGRESS NOTES
Assessment/Plan:For now, Juan Francisco would like to stay on remeron and seroquel and we will closely monitor him-have him return in 2 months time-referred to Psychiatry but I did tell him it might take awhile to be seen-routine labwork ordered         Problem List Items Addressed This Visit       Anxiety - Primary    Relevant Medications    mirtazapine (REMERON) 15 mg tablet    QUEtiapine (SEROquel) 50 mg tablet    Other Relevant Orders    Ambulatory referral to Psych Services    Recurrent major depressive disorder (HCC)    Relevant Medications    mirtazapine (REMERON) 15 mg tablet    QUEtiapine (SEROquel) 50 mg tablet    Other Relevant Orders    Ambulatory referral to Psych Services     Other Visit Diagnoses       Suicidal ideation        Other fatigue        Relevant Orders    CBC and differential    TSH, 3rd generation    Comprehensive metabolic panel    Lipid screening        Relevant Orders    Lipid panel    Encounter for medication monitoring        Relevant Orders    Comprehensive metabolic panel    Lipid panel              Subjective:      Patient ID: Juan Francisco Spence is a 24 y.o. male.    Juan Francisco here to re-establish care-I actually [facility] him [facility] and he's now living up here, working as a  with his brother but ended up with severe episode of depression and anxiety and suicidal ideation (wrote a suicide note on his phone) and was sent to [facility]-was put on remeron and seroquel-seem to be helping some-sleeping better-would like referral to psych services too-no current suicidal ideation    Medication Refill      The following portions of the patient's history were reviewed and updated as appropriate:   Past Medical History:  He has a past medical history of Anemia, Asthma, Depression, Lipoma of back, and Mass of subcutaneous tissue of back.,  _______________________________________________________________________  Medical Problems:  does not  have any pertinent problems on file.,  _______________________________________________________________________  Past Surgical History:   has a past surgical history that includes pr excision tumor soft tis back/flank subq 3 cm/> (N/A, 06/01/2021).,  _______________________________________________________________________  Family History:  family history includes ALS in his mother; Breast cancer in his maternal grandmother; Heart disease in his maternal grandmother; No Known Problems in his father, sister, sister, sister, sister, and sister.,  _______________________________________________________________________  Social History:   reports that he has never smoked. He has never used smokeless tobacco. He reports current alcohol use. He reports that he does not currently use drugs after having used the following drugs: Marijuana.,  _______________________________________________________________________  Allergies:  has No Known Allergies..  _______________________________________________________________________  Current Outpatient Medications   Medication Sig Dispense Refill    mirtazapine (REMERON) 15 mg tablet Take 1 tablet (15 mg total) by mouth daily at bedtime 30 tablet 3    QUEtiapine (SEROquel) 50 mg tablet Take 1 tablet (50 mg total) by mouth daily at bedtime 30 tablet 3     No current facility-administered medications for this visit.     _______________________________________________________________________  Review of Systems   Psychiatric/Behavioral:  Positive for dysphoric mood and suicidal ideas. The patient is nervous/anxious.          Objective:  Vitals:    07/09/24 1359   BP: 120/100   BP Location: Left arm   Patient Position: Sitting   Cuff Size: Standard   Pulse: 94   Resp: 16   Temp: (!) 97.2 °F (36.2 °C)   TempSrc: Tympanic   SpO2: 97%   Weight: 79.8 kg (176 lb)   Height: 6' (1.829 m)     Body mass index is 23.87 kg/m².     Physical Exam  Constitutional:       Appearance: Normal appearance.   HENT:       Head: Normocephalic and atraumatic.      Right Ear: External ear normal.      Left Ear: External ear normal.      Mouth/Throat:      Mouth: Mucous membranes are moist.   Cardiovascular:      Rate and Rhythm: Normal rate and regular rhythm.   Pulmonary:      Effort: Pulmonary effort is normal.      Breath sounds: Normal breath sounds.   Musculoskeletal:         General: Normal range of motion.      Cervical back: Normal range of motion.   Neurological:      General: No focal deficit present.      Mental Status: He is alert and oriented to person, place, and time.   Psychiatric:      Comments: Sad affect

## 2024-10-18 ENCOUNTER — TELEPHONE (OUTPATIENT)
Age: 24
End: 2024-10-18

## 2024-11-10 ENCOUNTER — HOSPITAL ENCOUNTER (EMERGENCY)
Facility: HOSPITAL | Age: 24
Discharge: HOME | End: 2024-11-10
Attending: EMERGENCY MEDICINE
Payer: MEDICARE

## 2024-11-10 VITALS
HEART RATE: 79 BPM | DIASTOLIC BLOOD PRESSURE: 90 MMHG | OXYGEN SATURATION: 100 % | SYSTOLIC BLOOD PRESSURE: 148 MMHG | TEMPERATURE: 98 F | RESPIRATION RATE: 12 BRPM

## 2024-11-10 DIAGNOSIS — Z00.8 ENCOUNTER FOR PSYCHOLOGICAL EVALUATION: Primary | ICD-10-CM

## 2024-11-10 LAB
ALBUMIN SERPL BCG-MCNC: 4.6 G/DL (ref 3.5–5)
ALP SERPL-CCNC: 45 U/L (ref 34–104)
ALT SERPL W P-5'-P-CCNC: 8 U/L (ref 7–52)
AMPHETAMINES SERPL QL SCN: NEGATIVE
ANION GAP SERPL CALCULATED.3IONS-SCNC: 7 MMOL/L (ref 4–13)
APAP SERPL-MCNC: <2 UG/ML (ref 10–20)
AST SERPL W P-5'-P-CCNC: 15 U/L (ref 13–39)
BARBITURATES UR QL: NEGATIVE
BASOPHILS # BLD AUTO: 0.02 THOUSANDS/ÂΜL (ref 0–0.1)
BASOPHILS NFR BLD AUTO: 0 % (ref 0–1)
BENZODIAZ UR QL: NEGATIVE
BILIRUB SERPL-MCNC: 1.04 MG/DL (ref 0.2–1)
BUN SERPL-MCNC: 11 MG/DL (ref 5–25)
CALCIUM SERPL-MCNC: 9.7 MG/DL (ref 8.4–10.2)
CHLORIDE SERPL-SCNC: 101 MMOL/L (ref 96–108)
CO2 SERPL-SCNC: 29 MMOL/L (ref 21–32)
COCAINE UR QL: NEGATIVE
CREAT SERPL-MCNC: 1.1 MG/DL (ref 0.6–1.3)
EOSINOPHIL # BLD AUTO: 0.1 THOUSAND/ÂΜL (ref 0–0.61)
EOSINOPHIL NFR BLD AUTO: 2 % (ref 0–6)
ERYTHROCYTE [DISTWIDTH] IN BLOOD BY AUTOMATED COUNT: 12.2 % (ref 11.6–15.1)
ETHANOL SERPL-MCNC: <10 MG/DL
FENTANYL UR QL SCN: NEGATIVE
GFR SERPL CREATININE-BSD FRML MDRD: 92 ML/MIN/1.73SQ M
GLUCOSE SERPL-MCNC: 79 MG/DL (ref 65–140)
HCT VFR BLD AUTO: 45.5 % (ref 36.5–49.3)
HGB BLD-MCNC: 15.7 G/DL (ref 12–17)
HYDROCODONE UR QL SCN: NEGATIVE
IMM GRANULOCYTES # BLD AUTO: 0.01 THOUSAND/UL (ref 0–0.2)
IMM GRANULOCYTES NFR BLD AUTO: 0 % (ref 0–2)
LYMPHOCYTES # BLD AUTO: 1.35 THOUSANDS/ÂΜL (ref 0.6–4.47)
LYMPHOCYTES NFR BLD AUTO: 27 % (ref 14–44)
MCH RBC QN AUTO: 28.4 PG (ref 26.8–34.3)
MCHC RBC AUTO-ENTMCNC: 34.5 G/DL (ref 31.4–37.4)
MCV RBC AUTO: 82 FL (ref 82–98)
METHADONE UR QL: NEGATIVE
MONOCYTES # BLD AUTO: 0.37 THOUSAND/ÂΜL (ref 0.17–1.22)
MONOCYTES NFR BLD AUTO: 8 % (ref 4–12)
NEUTROPHILS # BLD AUTO: 3.09 THOUSANDS/ÂΜL (ref 1.85–7.62)
NEUTS SEG NFR BLD AUTO: 63 % (ref 43–75)
NRBC BLD AUTO-RTO: 0 /100 WBCS
OPIATES UR QL SCN: NEGATIVE
OXYCODONE+OXYMORPHONE UR QL SCN: NEGATIVE
PCP UR QL: NEGATIVE
PLATELET # BLD AUTO: 262 THOUSANDS/UL (ref 149–390)
PMV BLD AUTO: 9.2 FL (ref 8.9–12.7)
POTASSIUM SERPL-SCNC: 3.7 MMOL/L (ref 3.5–5.3)
PROT SERPL-MCNC: 8 G/DL (ref 6.4–8.4)
RBC # BLD AUTO: 5.53 MILLION/UL (ref 3.88–5.62)
SALICYLATES SERPL-MCNC: <5 MG/DL (ref 3–20)
SODIUM SERPL-SCNC: 137 MMOL/L (ref 135–147)
THC UR QL: NEGATIVE
WBC # BLD AUTO: 4.94 THOUSAND/UL (ref 4.31–10.16)

## 2024-11-10 PROCEDURE — 36415 COLL VENOUS BLD VENIPUNCTURE: CPT | Performed by: EMERGENCY MEDICINE

## 2024-11-10 PROCEDURE — 85025 COMPLETE CBC W/AUTO DIFF WBC: CPT | Performed by: EMERGENCY MEDICINE

## 2024-11-10 PROCEDURE — 99284 EMERGENCY DEPT VISIT MOD MDM: CPT | Performed by: EMERGENCY MEDICINE

## 2024-11-10 PROCEDURE — 99285 EMERGENCY DEPT VISIT HI MDM: CPT

## 2024-11-10 PROCEDURE — 80053 COMPREHEN METABOLIC PANEL: CPT | Performed by: EMERGENCY MEDICINE

## 2024-11-10 PROCEDURE — 80143 DRUG ASSAY ACETAMINOPHEN: CPT | Performed by: EMERGENCY MEDICINE

## 2024-11-10 PROCEDURE — 93005 ELECTROCARDIOGRAM TRACING: CPT

## 2024-11-10 PROCEDURE — 80179 DRUG ASSAY SALICYLATE: CPT | Performed by: EMERGENCY MEDICINE

## 2024-11-10 PROCEDURE — 80307 DRUG TEST PRSMV CHEM ANLYZR: CPT | Performed by: EMERGENCY MEDICINE

## 2024-11-10 PROCEDURE — 82077 ASSAY SPEC XCP UR&BREATH IA: CPT | Performed by: EMERGENCY MEDICINE

## 2024-11-11 ENCOUNTER — VBI (OUTPATIENT)
Dept: FAMILY MEDICINE CLINIC | Facility: CLINIC | Age: 24
End: 2024-11-11

## 2024-11-11 LAB
ATRIAL RATE: 67 BPM
P AXIS: 66 DEGREES
PR INTERVAL: 144 MS
QRS AXIS: 96 DEGREES
QRSD INTERVAL: 114 MS
QT INTERVAL: 394 MS
QTC INTERVAL: 416 MS
T WAVE AXIS: 62 DEGREES
VENTRICULAR RATE: 67 BPM

## 2024-11-11 PROCEDURE — 93010 ELECTROCARDIOGRAM REPORT: CPT | Performed by: INTERNAL MEDICINE

## 2024-11-11 NOTE — TELEPHONE ENCOUNTER
No ED outreach required - patient seen in ED for sensitive DX, message sent to PCP for follow up as needed.

## 2024-11-14 ENCOUNTER — TELEPHONE (OUTPATIENT)
Age: 24
End: 2024-11-14

## 2024-11-14 NOTE — TELEPHONE ENCOUNTER
Patient is scheduled for today for ER follow. Patient is still currently in the ER getting metal eval. Mother was told to call office by  to see if patient could be see a little later today. Please call mom back.  Magaly Mahmood

## 2024-11-18 NOTE — ED PROVIDER NOTES
Time reflects when diagnosis was documented in both MDM as applicable and the Disposition within this note       Time User Action Codes Description Comment    11/10/2024  7:33 PM Omar Myers Add [Z00.8] Encounter for psychological evaluation           ED Disposition       ED Disposition   Discharge    Condition   Stable    Date/Time   Sun Nov 10, 2024  7:32 PM    Comment   Juan Francisco Spence discharge to home/self care.                   Assessment & Plan       Medical Decision Making  This is a 24-year-old male who presents to the emergency department for a psychiatric evaluation.  I considered suicidal ideation, homicidal ideation, depression. These and other diagnoses were considered.     The patient was well-appearing on exam.  He is not tachycardic or febrile.  He had no complaints on initial evaluation.  The patient denies for any other thoughts.  He does state he is depressed but has no intent on acting on any of his feelings.  He states he has good follow-up care and is unsure about where these accusations have come from.  There is been no one who has presented to the emergency department or through the [facility] for a 302.  The patient was held approximately 1 hour in the emergency department awaiting for someone to present, but they did not.  The patient will be discharged with follow-up to primary care physician.    Amount and/or Complexity of Data Reviewed  Labs: ordered.             Medications - No data to display    ED Risk Strat Scores                           SBIRT 20yo+      Flowsheet Row Most Recent Value   Initial Alcohol Screen: US AUDIT-C     1. How often do you have a drink containing alcohol? 0 Filed at: 11/10/2024 1814   2. How many drinks containing alcohol do you have on a typical day you are drinking?  0 Filed at: 11/10/2024 1814   3a. Male UNDER 65: How often do you have five or more drinks on one occasion? 0 Filed at: 11/10/2024 1814   3b. FEMALE Any Age, or MALE 65+:  How often do you have 4 or more drinks on one occassion? 0 Filed at: 11/10/2024 1814   Audit-C Score 0 Filed at: 11/10/2024 1814   CLAUDINE: How many times in the past year have you...    Used an illegal drug or used a prescription medication for non-medical reasons? Never Filed at: 11/10/2024 1814                            History of Present Illness       Chief Complaint   Patient presents with    Overdose - Intentional     Pt presents sp psb OD on psych meds       Past Medical History:   Diagnosis Date    Anemia     Asthma     Depression     Lipoma of back     Mass of subcutaneous tissue of back       Past Surgical History:   Procedure Laterality Date    LA EXCISION TUMOR SOFT TIS BACK/FLANK SUBQ 3 CM/> N/A 06/01/2021    Procedure: EXCISION SUBMUSCULAR LIPOMA BACK;  Surgeon: Robert Bloch, MD;  Location:  MAIN OR;  Service: General      Family History   Problem Relation Age of Onset    ALS Mother     No Known Problems Father     No Known Problems Sister     No Known Problems Sister     No Known Problems Sister     No Known Problems Sister     No Known Problems Sister     Heart disease Maternal Grandmother     Breast cancer Maternal Grandmother       Social History     Tobacco Use    Smoking status: Never    Smokeless tobacco: Never   Vaping Use    Vaping status: Some Days    Substances: Nicotine, THC, Flavoring   Substance Use Topics    Alcohol use: Yes     Comment: occassional    Drug use: Not Currently     Types: Marijuana     Comment: none      E-Cigarette/Vaping    E-Cigarette Use Current Some Day User       E-Cigarette/Vaping Substances    Nicotine Yes     THC Yes     CBD No     Flavoring Yes     Other No     Unknown No       I have reviewed and agree with the history as documented.     This is a 24-year-old male presents to the emergency department  with EMS.  As per EMS the patient made suicidal threats.  The patient denies any suicidal or homicidal ideation.  He denies making any threats.  He has his phone  which she shows me that has no threats on it.  He denies chest pain or trouble breathing.  He denies fevers or chills.        Review of Systems   All other systems reviewed and are negative.          Objective       ED Triage Vitals   Temperature Pulse Blood Pressure Respirations SpO2 Patient Position - Orthostatic VS   11/10/24 1812 11/10/24 1813 11/10/24 1812 11/10/24 1812 11/10/24 1813 11/10/24 1830   98 °F (36.7 °C) 68 164/86 17 100 % Lying      Temp src Heart Rate Source BP Location FiO2 (%) Pain Score    -- 11/10/24 1830 11/10/24 1830 -- --     Monitor Left arm        Vitals      Date and Time Temp Pulse SpO2 Resp BP Pain Score FACES Pain Rating User   11/10/24 1830 -- 79 100 % 12 148/90 -- -- DG   11/10/24 1813 -- 68 100 % -- -- -- -- DB   11/10/24 1812 98 °F (36.7 °C) -- -- 17 164/86 -- -- DB            Physical Exam  Constitutional:  Vital signs reviewed, patient appears nontoxic, no acute distress, appears calm at the bedside.  Answering questions appropriately  Eyes: Pupils equal round reactive to light and accommodation, extraocular muscles intact  HEENT: trachea midline, no JVD, moist mucous membranes  Respiratory: lung sounds clear throughout, no rhonchi, no rales  Cardiovascular: regular rate rhythm, no murmurs or rubs  Abdomen: soft, nontender, nondistended, no rebound or guarding  Back: no CVA tenderness, normal inspection  Extremities: no edema, pulses equal in all 4 extremities  Neuro: awake, alert, oriented, no focal weakness  Skin: warm, dry, intact, no rashes noted    Results Reviewed       Procedure Component Value Units Date/Time    Rapid drug screen, urine [[id number]]  (Normal) Collected: 11/10/24 1931    Lab Status: Final result Specimen: Urine, Clean Catch Updated: 11/10/24 1952     Amph/Meth UR Negative     Barbiturate Ur Negative     Benzodiazepine Urine Negative     Cocaine Urine Negative     Methadone Urine Negative     Opiate Urine Negative     PCP Ur Negative     THC Urine Negative      Oxycodone Urine Negative     Fentanyl Urine Negative     HYDROCODONE URINE Negative    Narrative:      FOR MEDICAL PURPOSES ONLY.   IF CONFIRMATION NEEDED PLEASE CONTACT THE LAB WITHIN 5 DAYS.    Drug Screen Cutoff Levels:  AMPHETAMINE/METHAMPHETAMINES  1000 ng/mL  BARBITURATES     200 ng/mL  BENZODIAZEPINES     200 ng/mL  COCAINE      300 ng/mL  METHADONE      300 ng/mL  OPIATES      300 ng/mL  PHENCYCLIDINE     25 ng/mL  THC       50 ng/mL  OXYCODONE      100 ng/mL  FENTANYL      5 ng/mL  HYDROCODONE     300 ng/mL    Salicylate level [[id number]]  (Normal) Collected: 11/10/24 1845    Lab Status: Final result Specimen: Blood from Arm, Left Updated: 11/10/24 1931     Salicylate Lvl <5 mg/dL     Acetaminophen level-If concentration is detectable, please discuss with medical  on call. [[id number]]  (Abnormal) Collected: 11/10/24 1845    Lab Status: Final result Specimen: Blood from Arm, Left Updated: 11/10/24 1931     Acetaminophen Level <2 ug/mL     Comprehensive metabolic panel [[id number]]  (Abnormal) Collected: 11/10/24 1845    Lab Status: Final result Specimen: Blood from Arm, Left Updated: 11/10/24 1908     Sodium 137 mmol/L      Potassium 3.7 mmol/L      Chloride 101 mmol/L      CO2 29 mmol/L      ANION GAP 7 mmol/L      BUN 11 mg/dL      Creatinine 1.10 mg/dL      Glucose 79 mg/dL      Calcium 9.7 mg/dL      AST 15 U/L      ALT 8 U/L      Alkaline Phosphatase 45 U/L      Total Protein 8.0 g/dL      Albumin 4.6 g/dL      Total Bilirubin 1.04 mg/dL      eGFR 92 ml/min/1.73sq m     Narrative:      National Kidney Disease Foundation guidelines for Chronic Kidney Disease (CKD):     Stage 1 with normal or high GFR (GFR > 90 mL/min/1.73 square meters)    Stage 2 Mild CKD (GFR = 60-89 mL/min/1.73 square meters)    Stage 3A Moderate CKD (GFR = 45-59 mL/min/1.73 square meters)    Stage 3B Moderate CKD (GFR = 30-44 mL/min/1.73 square meters)    Stage 4 Severe CKD (GFR = 15-29 mL/min/1.73 square meters)     Stage 5 End Stage CKD (GFR <15 mL/min/1.73 square meters)  Note: GFR calculation is accurate only with a steady state creatinine    Ethanol [[id number]]  (Normal) Collected: 11/10/24 1845    Lab Status: Final result Specimen: Blood from Arm, Left Updated: 11/10/24 1906     Ethanol Lvl <10 mg/dL     CBC and differential [[id number]] Collected: 11/10/24 1845    Lab Status: Final result Specimen: Blood from Arm, Left Updated: 11/10/24 1852     WBC 4.94 Thousand/uL      RBC 5.53 Million/uL      Hemoglobin 15.7 g/dL      Hematocrit 45.5 %      MCV 82 fL      MCH 28.4 pg      MCHC 34.5 g/dL      RDW 12.2 %      MPV 9.2 fL      Platelets 262 Thousands/uL      nRBC 0 /100 WBCs      Segmented % 63 %      Immature Grans % 0 %      Lymphocytes % 27 %      Monocytes % 8 %      Eosinophils Relative 2 %      Basophils Relative 0 %      Absolute Neutrophils 3.09 Thousands/µL      Absolute Immature Grans 0.01 Thousand/uL      Absolute Lymphocytes 1.35 Thousands/µL      Absolute Monocytes 0.37 Thousand/µL      Eosinophils Absolute 0.10 Thousand/µL      Basophils Absolute 0.02 Thousands/µL             No orders to display       Procedures    ED Medication and Procedure Management   Prior to Admission Medications   Prescriptions Last Dose Informant Patient Reported? Taking?   QUEtiapine (SEROquel) 50 mg tablet   No No   Sig: Take 1 tablet (50 mg total) by mouth daily at bedtime   mirtazapine (REMERON) 15 mg tablet   No No   Sig: Take 1 tablet (15 mg total) by mouth daily at bedtime      Facility-Administered Medications: None     Discharge Medication List as of 11/10/2024  7:33 PM        CONTINUE these medications which have NOT CHANGED    Details   mirtazapine (REMERON) 15 mg tablet Take 1 tablet (15 mg total) by mouth daily at bedtime, Starting Tue 7/9/2024, Normal      QUEtiapine (SEROquel) 50 mg tablet Take 1 tablet (50 mg total) by mouth daily at bedtime, Starting Tue 7/9/2024, Normal           No discharge procedures on file.  ED  SEPSIS DOCUMENTATION   Time reflects when diagnosis was documented in both MDM as applicable and the Disposition within this note       Time User Action Codes Description Comment    11/10/2024  7:33 PM Omar Myers Add [Z00.8] Encounter for psychological evaluation                  Omar Myers,   11/18/24 1231

## 2025-02-26 ENCOUNTER — TELEPHONE (OUTPATIENT)
Age: 25
End: 2025-02-26

## 2025-02-26 NOTE — TELEPHONE ENCOUNTER
Reached out to pt in regards to Medication Management wait list maintenance & to gather pt preferences. LVM for pt to contact intake dept.      If no longer interested, please remove from wait list. Outside resources can be offered.     No available appts at this time.     First attempt

## 2025-03-05 NOTE — TELEPHONE ENCOUNTER
Patient called the RX Refill Line. Message is being forwarded to the office.     Patient is returning office call. Patient was made aware of office message. Please review.    Please contact patient at [phone]

## 2025-04-16 ENCOUNTER — TELEPHONE (OUTPATIENT)
Dept: FAMILY MEDICINE CLINIC | Facility: CLINIC | Age: 25
End: 2025-04-16

## (undated) DEVICE — PADS GROUND

## (undated) DEVICE — INTENDED FOR TISSUE SEPARATION, AND OTHER PROCEDURES THAT REQUIRE A SHARP SURGICAL BLADE TO PUNCTURE OR CUT.: Brand: BARD-PARKER SAFETY BLADES SIZE 15, STERILE

## (undated) DEVICE — GLOVE SRG BIOGEL 7.5

## (undated) DEVICE — MINOR PROCEDURE DRAPE: Brand: CONVERTORS

## (undated) DEVICE — LIGHT HANDLE COVER SLEEVE DISP BLUE STELLAR

## (undated) DEVICE — ANTIBACTERIAL UNDYED BRAIDED (POLYGLACTIN 910), SYNTHETIC ABSORBABLE SUTURE: Brand: COATED VICRYL

## (undated) DEVICE — BETHLEHEM UNIVERSAL MINOR GEN: Brand: CARDINAL HEALTH

## (undated) DEVICE — PLUMEPEN PRO 10FT

## (undated) DEVICE — SUT MONOCRYL 4-0 PS-2 18 IN Y496G

## (undated) DEVICE — NEEDLE 25G X 1 1/2

## (undated) DEVICE — ADHESIVE SKIN HIGH VISCOSITY EXOFIN 1ML